# Patient Record
Sex: FEMALE | Race: WHITE
[De-identification: names, ages, dates, MRNs, and addresses within clinical notes are randomized per-mention and may not be internally consistent; named-entity substitution may affect disease eponyms.]

---

## 2017-07-05 ENCOUNTER — HOSPITAL ENCOUNTER (EMERGENCY)
Dept: HOSPITAL 47 - EC | Age: 72
Discharge: HOME | End: 2017-07-05
Payer: MEDICARE

## 2017-07-05 VITALS
DIASTOLIC BLOOD PRESSURE: 78 MMHG | TEMPERATURE: 98.1 F | SYSTOLIC BLOOD PRESSURE: 177 MMHG | HEART RATE: 56 BPM | RESPIRATION RATE: 18 BRPM

## 2017-07-05 DIAGNOSIS — Z79.899: ICD-10-CM

## 2017-07-05 DIAGNOSIS — Z53.20: ICD-10-CM

## 2017-07-05 DIAGNOSIS — I10: ICD-10-CM

## 2017-07-05 DIAGNOSIS — T18.128A: Primary | ICD-10-CM

## 2017-07-05 DIAGNOSIS — E78.5: ICD-10-CM

## 2017-07-05 DIAGNOSIS — Z91.030: ICD-10-CM

## 2017-07-05 DIAGNOSIS — Z88.6: ICD-10-CM

## 2017-07-05 PROCEDURE — 43247 EGD REMOVE FOREIGN BODY: CPT

## 2017-07-05 PROCEDURE — 96374 THER/PROPH/DIAG INJ IV PUSH: CPT

## 2017-07-05 PROCEDURE — 71020: CPT

## 2017-07-05 PROCEDURE — 96375 TX/PRO/DX INJ NEW DRUG ADDON: CPT

## 2017-07-05 PROCEDURE — 99283 EMERGENCY DEPT VISIT LOW MDM: CPT

## 2017-07-05 PROCEDURE — 96361 HYDRATE IV INFUSION ADD-ON: CPT

## 2017-07-05 NOTE — ED
General Adult HPI





- General


Source: patient, RN notes reviewed


Mode of arrival: ambulatory


Limitations: no limitations





<Joe Sims - Last Filed: 07/05/17 15:16>





<Trip Muller - Last Filed: 07/05/17 22:06>





- General


Chief complaint: ENT


Stated complaint: food in throat


Time Seen by Provider: 07/05/17 14:01





- History of Present Illness


Initial comments: 





Patient is a pleasant 71-year-old female presenting to the emergency Department 

with concern for food stuck in her throat.  Onset was 1:00 yesterday when 

patient was eating hamburger.  Patient has been unable to swallow liquids her 

pills or solids since that time.  Patient has had some similar symptoms 

previously that she is able to clear on her own however this time she is not.  

Patient has never had a scope done before.  Patient does have a history of 

ocular pharyngeal muscular dystrophy. (Joe Sims)





- Related Data


 Home Medications











 Medication  Instructions  Recorded  Confirmed


 


Levothyroxine Sodium [Synthroid] 75 mcg PO DAILY 12/23/15 07/05/17


 


Losartan Potassium 100 mg PO DAILY 12/23/15 07/05/17


 


Simvastatin [Zocor] 40 mg PO HS 12/23/15 07/05/17


 


Mirtazapine [Remeron] 15 mg PO HS 07/05/17 07/05/17











 Allergies











Allergy/AdvReac Type Severity Reaction Status Date / Time


 


venom-honey bee Allergy  Anaphylaxis Verified 07/05/17 14:21





[bee venom (honey bee)]     


 


aspirin AdvReac  severe Verified 07/05/17 14:21





   stomach  





   pain  














Review of Systems


ROS Other: All systems not noted in ROS Statement are negative.


Constitutional: Denies: fever


Eyes: Denies: eye pain


ENT: Denies: ear pain


Respiratory: Denies: dyspnea


Cardiovascular: Denies: chest pain


Endocrine: Denies: fatigue


Gastrointestinal: Reports: nausea.  Denies: abdominal pain


Genitourinary: Denies: dysuria


Musculoskeletal: Denies: back pain


Skin: Denies: rash


Neurological: Denies: headache





<Joe Sims - Last Filed: 07/05/17 15:16>


ROS Other: All systems not noted in ROS Statement are negative.





<Trip Muller - Last Filed: 07/05/17 22:06>


ROS Statement: 


Those systems with pertinent positive or pertinent negative responses have been 

documented in the HPI.








Past Medical History


Past Medical History: Hyperlipidemia, Hypertension


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


History of Any Multi-Drug Resistant Organisms: None Reported


Past Surgical History: Appendectomy, Hysterectomy, Orthopedic Surgery


Past Psychological History: No Psychological Hx Reported


Smoking Status: Never smoker


Past Alcohol Use History: None Reported


Past Drug Use History: None Reported





- Past Family History


  ** Father


Additional Family Medical History / Comment(s): lung





  ** Mother


Additional Family Medical History / Comment(s): pancreas,liver





<Joe Sims - Last Filed: 07/05/17 15:16>





General Exam


Limitations: no limitations


General appearance: alert, in no apparent distress


Head exam: Present: atraumatic


Eye exam: Present: normal appearance, PERRL


ENT exam: Present: normal oropharynx


Neck exam: Present: normal inspection


Respiratory exam: Present: normal lung sounds bilaterally


Cardiovascular Exam: Present: regular rate, normal rhythm


GI/Abdominal exam: Present: soft.  Absent: tenderness


Extremities exam: Present: normal inspection


Neurological exam: Present: alert


Psychiatric exam: Present: normal affect, normal mood


Skin exam: Present: normal color





<Joe Sims - Last Filed: 07/05/17 15:16>





Course





<Joe Sims - Last Filed: 07/05/17 15:16>





<Trip Muller - Last Filed: 07/05/17 22:06>


 Vital Signs











  07/05/17 07/05/17 07/05/17





  13:31 13:57 13:59


 


Temperature 98.0 F  


 


Pulse Rate 68 75 


 


Pulse Rate [   





Cardiac Monitor   





]   


 


Respiratory 20 18 18





Rate   


 


Blood Pressure 187/76 151/86 


 


Blood Pressure   





[Left Arm]   


 


O2 Sat by Pulse 93 L 95 





Oximetry   














  07/05/17 07/05/17 07/05/17





  14:48 15:58 17:03


 


Temperature  97.5 F L 97.5 F L


 


Pulse Rate 74 88 61


 


Pulse Rate [   





Cardiac Monitor   





]   


 


Respiratory 18 20 18





Rate   


 


Blood Pressure 169/87 174/81 204/89


 


Blood Pressure   





[Left Arm]   


 


O2 Sat by Pulse 90 L 95 96





Oximetry   














  07/05/17 07/05/17 07/05/17





  17:40 19:25 20:17


 


Temperature 97.7 F  


 


Pulse Rate 73  


 


Pulse Rate [  54 L 66





Cardiac Monitor   





]   


 


Respiratory 18 18 18





Rate   


 


Blood Pressure 199/89  


 


Blood Pressure  143/65 148/90





[Left Arm]   


 


O2 Sat by Pulse 96 97 94 L





Oximetry   














  07/05/17





  20:18


 


Temperature 97.0 F L


 


Pulse Rate 61


 


Pulse Rate [ 





Cardiac Monitor 





] 


 


Respiratory 15





Rate 


 


Blood Pressure 148/90


 


Blood Pressure 





[Left Arm] 


 


O2 Sat by Pulse 91 L





Oximetry 











Nursing staff brought to my attention that her blood pressure was high she was 

unable to swallow her pills earlier today and she still unable to swallow any 

pills considering that she was given some Vasotec IV some nitro topical, 

hopefully that will help of his blood pressure will continue to keep an eye on 

him if necessary will try another agent (Trip Muller)





- Reevaluation(s)


Reevaluation #1: 





07/05/17 15:16


No improvement with glucagon.  Case was discussed with Dr. Hopkins who is 

currently heading to another facility to see patient's but will come scope the 

patient afterwards. (Joe Sims)


Reevaluation #2: 





07/05/17 22:00








She is reassessed at 2130 she is feeling better she is able to swallow her 

saliva she is able to drink, there is no nausea is no epigastric pain or 

abdominal pain.  She will see Dr. Tamez next week and she is advised to stick 

With the Dr. Tamez's direction was advised to return to the ER if symptoms get 

worse (Trip Muller)





Disposition





<Joe Sims - Last Filed: 07/05/17 15:16>





<Trip Muller - Last Filed: 07/05/17 22:06>


Clinical Impression: 


 Foreign body in esophagus





Disposition: HOME SELF-CARE


Condition: Good


Instructions:  Esophageal Foreign Body (ED)


Additional Instructions: 


Is advised to see Dr. Tamez next week


Referrals: 


Brennen Cardenas MD [Primary Care Provider] - 1-2 days


Joseph Stringer MD [STAFF PHYSICIAN] - 1-2 days

## 2017-07-05 NOTE — P.PCN
Date of Procedure: 07/05/17


Preoperative Diagnosis: 





Postoperative Diagnosis: 





Procedure(s) Performed: 


Procedure: Esophagogastroscopy for removal of esophageal foreign body.





Preoperative diagnosis: Obstructive dysphagia.





Postoperative diagnosis: 1. Impacted piece of meat in the esophagus, fragmented 

and advanced into the stomach by gentle passing of the endoscope back and 

forth.  2. Benign esophageal stricture allowing the advancement of the 

endoscope.  3. No obvious abnormalities in the stomach.





Preparation and sedation: Was provided by anesthesia.





Brief clinical history: The patient is a pleasant 71-year-old female who 

presented to the emergency Department with concern of food stuck in her throat.

  Onset was 1:00 yesterday when patient was eating hamburger.  Patient has been 

unable to swallow liquids, her pills or solids since that time.  Patient has 

had similar symptoms previously that she was able to clear on her own but not 

this time.  Patient has never had an upper endoscopy in the past.  Patient does 

have a history of pharyngeal muscular dystrophy.





Procedure: With the patient on her left lateral decubitus position and after 

informed consent and adequate sedation, I passed the Olympus- video 

upper endoscope through the cricopharyngeus.  Upon entering the esophagus I saw 

thick secretions and food debris which I suctioned and the floating pieces of 

meat and the larger impacted piece of meat in the distal esophagus were noted.  

While maneuvering with the endoscope to arrive at the level of the GE junction 

the impacted piece of meat was broken into smaller pieces which I then was able 

to advance into the stomach with the gentle passing of the endoscope back and 

forth.  There was a benign stricture that just allowed the passing of the 

endoscope which measures between 9 and 10 mm.  No obvious esophagitis or Serna

's esophagus.  The endoscope was then passed into the stomach which was 

insufflated with air and inspected in detail including the retroflex view in 

the cardia.  No obvious abnormalities were seen.  I suctioned significant 

amount of fluids and food debris in the stomach and concluded the exam without 

performing any dilation at this time because of the presence of food debris and 

significant amount of secretions that were regurgitating into the esophagus in 

addition to areas of friability at the site of the food impaction.





The patient tolerated the procedure well.





Plan: The patient was reassured.  We will advise liquid diet today and she can 

then advance her diet to her normal diet tomorrow.  I would recommend that we 

see her in follow-up and schedule elective upper endoscopy for the purpose of 

dilation of her esophagus electively after an overnight fasting, especially if 

she has recurrent symptoms.  She will be discussing that with you and I will be 

happy to see her in F/U for such evaluation.


Implants: 





Indications for Procedure: 





Operative Findings: 





Description of Procedure:

## 2017-07-05 NOTE — XR
EXAMINATION TYPE: XR chest 2V

 

DATE OF EXAM: 7/5/2017

 

COMPARISON: NONE

 

HISTORY: Possible foreign body

 

TECHNIQUE:  Frontal and lateral views of the chest are obtained.

 

FINDINGS:  There is no heart failure nor confluent pneumonic infiltrate. Costophrenic angles are evangelina
r. Heart is slightly enlarged. Thoracic aorta is atheromatous. There are chest leads.

 

IMPRESSION:  Cardiomegaly. No active cardiopulmonary disease. No sign of radiopaque foreign body.

## 2018-09-25 ENCOUNTER — HOSPITAL ENCOUNTER (EMERGENCY)
Dept: HOSPITAL 47 - EC | Age: 73
Discharge: HOME | End: 2018-09-25
Payer: MEDICARE

## 2018-09-25 VITALS — TEMPERATURE: 97.8 F | SYSTOLIC BLOOD PRESSURE: 177 MMHG | DIASTOLIC BLOOD PRESSURE: 78 MMHG | HEART RATE: 78 BPM

## 2018-09-25 VITALS — RESPIRATION RATE: 18 BRPM

## 2018-09-25 DIAGNOSIS — Z91.030: ICD-10-CM

## 2018-09-25 DIAGNOSIS — I10: ICD-10-CM

## 2018-09-25 DIAGNOSIS — E78.5: ICD-10-CM

## 2018-09-25 DIAGNOSIS — Z98.890: ICD-10-CM

## 2018-09-25 DIAGNOSIS — Z88.6: ICD-10-CM

## 2018-09-25 DIAGNOSIS — G71.0: ICD-10-CM

## 2018-09-25 DIAGNOSIS — Z90.49: ICD-10-CM

## 2018-09-25 DIAGNOSIS — Z90.710: ICD-10-CM

## 2018-09-25 DIAGNOSIS — Z79.899: ICD-10-CM

## 2018-09-25 DIAGNOSIS — M79.89: ICD-10-CM

## 2018-09-25 DIAGNOSIS — L03.116: Primary | ICD-10-CM

## 2018-09-25 LAB
ALBUMIN SERPL-MCNC: 4 G/DL (ref 3.5–5)
ALP SERPL-CCNC: 64 U/L (ref 38–126)
ALT SERPL-CCNC: 28 U/L (ref 9–52)
ANION GAP SERPL CALC-SCNC: 9 MMOL/L
AST SERPL-CCNC: 29 U/L (ref 14–36)
BASOPHILS # BLD AUTO: 0 K/UL (ref 0–0.2)
BASOPHILS NFR BLD AUTO: 1 %
BUN SERPL-SCNC: 11 MG/DL (ref 7–17)
CALCIUM SPEC-MCNC: 9.2 MG/DL (ref 8.4–10.2)
CHLORIDE SERPL-SCNC: 109 MMOL/L (ref 98–107)
CO2 SERPL-SCNC: 25 MMOL/L (ref 22–30)
EOSINOPHIL # BLD AUTO: 0.3 K/UL (ref 0–0.7)
EOSINOPHIL NFR BLD AUTO: 6 %
ERYTHROCYTE [DISTWIDTH] IN BLOOD BY AUTOMATED COUNT: 3.92 M/UL (ref 3.8–5.4)
ERYTHROCYTE [DISTWIDTH] IN BLOOD: 13.3 % (ref 11.5–15.5)
GLUCOSE SERPL-MCNC: 99 MG/DL (ref 74–99)
HCT VFR BLD AUTO: 37.1 % (ref 34–46)
HGB BLD-MCNC: 12.1 GM/DL (ref 11.4–16)
LYMPHOCYTES # SPEC AUTO: 1.9 K/UL (ref 1–4.8)
LYMPHOCYTES NFR SPEC AUTO: 41 %
MCH RBC QN AUTO: 30.8 PG (ref 25–35)
MCHC RBC AUTO-ENTMCNC: 32.6 G/DL (ref 31–37)
MCV RBC AUTO: 94.6 FL (ref 80–100)
MONOCYTES # BLD AUTO: 0.3 K/UL (ref 0–1)
MONOCYTES NFR BLD AUTO: 6 %
NEUTROPHILS # BLD AUTO: 2 K/UL (ref 1.3–7.7)
NEUTROPHILS NFR BLD AUTO: 44 %
PH UR: 5.5 [PH] (ref 5–8)
PLATELET # BLD AUTO: 172 K/UL (ref 150–450)
POTASSIUM SERPL-SCNC: 3.9 MMOL/L (ref 3.5–5.1)
PROT SERPL-MCNC: 7.1 G/DL (ref 6.3–8.2)
RBC UR QL: 2 /HPF (ref 0–5)
SODIUM SERPL-SCNC: 143 MMOL/L (ref 137–145)
SP GR UR: 1.02 (ref 1–1.03)
SQUAMOUS UR QL AUTO: 12 /HPF (ref 0–4)
UROBILINOGEN UR QL STRIP: <2 MG/DL (ref ?–2)
WBC # BLD AUTO: 4.5 K/UL (ref 3.8–10.6)
WBC #/AREA URNS HPF: 52 /HPF (ref 0–5)

## 2018-09-25 PROCEDURE — 83880 ASSAY OF NATRIURETIC PEPTIDE: CPT

## 2018-09-25 PROCEDURE — 93971 EXTREMITY STUDY: CPT

## 2018-09-25 PROCEDURE — 36415 COLL VENOUS BLD VENIPUNCTURE: CPT

## 2018-09-25 PROCEDURE — 87086 URINE CULTURE/COLONY COUNT: CPT

## 2018-09-25 PROCEDURE — 86140 C-REACTIVE PROTEIN: CPT

## 2018-09-25 PROCEDURE — 80053 COMPREHEN METABOLIC PANEL: CPT

## 2018-09-25 PROCEDURE — 81001 URINALYSIS AUTO W/SCOPE: CPT

## 2018-09-25 PROCEDURE — 96374 THER/PROPH/DIAG INJ IV PUSH: CPT

## 2018-09-25 PROCEDURE — 85025 COMPLETE CBC W/AUTO DIFF WBC: CPT

## 2018-09-25 PROCEDURE — 99284 EMERGENCY DEPT VISIT MOD MDM: CPT

## 2018-09-25 NOTE — ED
General Adult HPI





- General


Chief complaint: Extremity Problem,Nontraumatic


Stated complaint: legs swelling and itching


Time Seen by Provider: 09/25/18 15:36


Source: patient, RN notes reviewed


Mode of arrival: ambulatory


Limitations: no limitations





- History of Present Illness


Initial comments: 





72-year-old female presents to the emergency department for a chief complaint 

of swelling in the left leg as well as itching and redness in bilateral legs.  

Patient states bilateral lower extremity itching and erythema started 3 days 

ago.  Patient states that late last night she started noticed swelling in the 

left foot and ankle.  Patient denies swelling in the right foot or ankle.  

Patient denies history of CHF.  Patient denies any shortness of breath or chest 

pain.  Patient denies any fevers or chills at home.  Patient states she tried 

to get into her primary care provider but was unable to do so as [place] 

[place].  Patient denies pain in either calf. Patient has no other 

complaints at this time including shortness of breath, chest pain, abdominal 

pain, nausea or vomiting, headache, or visual changes.





- Related Data


 Home Medications











 Medication  Instructions  Recorded  Confirmed


 


Levothyroxine Sodium [Synthroid] 75 mcg PO DAILY 12/23/15 09/25/18


 


Losartan Potassium 100 mg PO DAILY 12/23/15 09/25/18


 


Simvastatin [Zocor] 20 mg PO HS 09/25/18 09/25/18








 Previous Rx's











 Medication  Instructions  Recorded


 


Cephalexin [Keflex] 500 mg PO Q6H 10 Days #20 cap 09/25/18


 


hydrOXYzine HCL [Atarax] 25 mg PO TID PRN #20 tab 09/25/18











 Allergies











Allergy/AdvReac Type Severity Reaction Status Date / Time


 


venom-honey bee Allergy  Anaphylaxis Verified 09/25/18 16:31





[bee venom (honey bee)]     


 


aspirin AdvReac  severe Verified 09/25/18 16:31





   stomach  





   pain  














Review of Systems


ROS Statement: 


Those systems with pertinent positive or pertinent negative responses have been 

documented in the HPI.





ROS Other: All systems not noted in ROS Statement are negative.





Past Medical History


Past Medical History: Hyperlipidemia, Hypertension


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


History of Any Multi-Drug Resistant Organisms: None Reported


Past Surgical History: Appendectomy, Hysterectomy, Orthopedic Surgery


Past Psychological History: No Psychological Hx Reported


Smoking Status: Never smoker


Past Alcohol Use History: None Reported


Past Drug Use History: None Reported





- Past Family History


  ** Father


Additional Family Medical History / Comment(s): lung





  ** Mother


Additional Family Medical History / Comment(s): pancreas,liver





General Exam


Limitations: no limitations


General appearance: alert, in no apparent distress


Head exam: Present: atraumatic, normocephalic, normal inspection


Eye exam: Present: normal appearance.  Absent: scleral icterus, conjunctival 

injection


ENT exam: Present: normal exam, mucous membranes moist


Neck exam: Present: normal inspection, full ROM.  Absent: tenderness, 

meningismus, lymphadenopathy


Respiratory exam: Present: normal lung sounds bilaterally.  Absent: respiratory 

distress, wheezes, rales, rhonchi, stridor


Cardiovascular Exam: Present: regular rate, normal rhythm, normal heart sounds.

  Absent: systolic murmur, diastolic murmur, rubs, gallop, clicks


GI/Abdominal exam: Present: soft, normal bowel sounds.  Absent: distended, 

tenderness, guarding, rebound, rigid


Extremities exam: Present: full ROM (Full range of motion in the lower 

extremities bilaterally), normal capillary refill (Capillary refill less than 2 

seconds and PT pulse 2+ in lower extremities bilaterally), pedal edema (2+ 

pitting edema noted in the left foot and ankle.  Patient has pitting edema 

noted in the right ankle but not the right foot.).  Absent: tenderness (No 

tenderness noted in the lower extremities), calf tenderness (No tenderness in 

the calves bilaterally.  Negative Homans sign bilaterally.  No erythema, 

swelling, or warmth noted bilaterally)


Neurological exam: Present: alert, oriented X3, CN II-XII intact


Psychiatric exam: Present: normal affect, normal mood





Course


 Vital Signs











  09/25/18 09/25/18





  15:21 17:46


 


Temperature 98.2 F 98 F


 


Pulse Rate 67 62


 


Respiratory 18 18





Rate  


 


Blood Pressure 157/70 185/80


 


O2 Sat by Pulse 97 97





Oximetry  














Medical Decision Making





- Medical Decision Making





72-year-old female presents to the emergency department for redness and itching 

in lower ankles 3 days and left ankle swelling times one day.  Patient denies 

fevers or chills at home.  On exam patient does have mild erythema noted on the 

medial ankles as well as mild pitting edema of the left ankle and foot.  

Erythema consistent with a cellulitis.  CBC and CMP unremarkable.  White count 

4.5.  CRP 7.4.  .  Doppler negative for clot.  Patient will be treated 

with Keflex 10 days.  She was given a dose here.  She will be given Atarax for 

itching.  She was educated not to drive or operate machinery while taking 

Atarax.  Patient is aware to return to the emergency Department if she has any 

worsening symptoms which she agrees with.





- Lab Data


Result diagrams: 


 09/25/18 17:26





 09/25/18 17:26


 Lab Results











  09/25/18 09/25/18 09/25/18 Range/Units





  17:26 17:26 17:26 


 


WBC  4.5    (3.8-10.6)  k/uL


 


RBC  3.92    (3.80-5.40)  m/uL


 


Hgb  12.1    (11.4-16.0)  gm/dL


 


Hct  37.1    (34.0-46.0)  %


 


MCV  94.6    (80.0-100.0)  fL


 


MCH  30.8    (25.0-35.0)  pg


 


MCHC  32.6    (31.0-37.0)  g/dL


 


RDW  13.3    (11.5-15.5)  %


 


Plt Count  172    (150-450)  k/uL


 


Neutrophils %  44    %


 


Lymphocytes %  41    %


 


Monocytes %  6    %


 


Eosinophils %  6    %


 


Basophils %  1    %


 


Neutrophils #  2.0    (1.3-7.7)  k/uL


 


Lymphocytes #  1.9    (1.0-4.8)  k/uL


 


Monocytes #  0.3    (0-1.0)  k/uL


 


Eosinophils #  0.3    (0-0.7)  k/uL


 


Basophils #  0.0    (0-0.2)  k/uL


 


Sodium   143   (137-145)  mmol/L


 


Potassium   3.9   (3.5-5.1)  mmol/L


 


Chloride   109 H   ()  mmol/L


 


Carbon Dioxide   25   (22-30)  mmol/L


 


Anion Gap   9   mmol/L


 


BUN   11   (7-17)  mg/dL


 


Creatinine   0.79   (0.52-1.04)  mg/dL


 


Est GFR (CKD-EPI)AfAm   87   (>60 ml/min/1.73 sqM)  


 


Est GFR (CKD-EPI)NonAf   75   (>60 ml/min/1.73 sqM)  


 


Glucose   99   (74-99)  mg/dL


 


Calcium   9.2   (8.4-10.2)  mg/dL


 


Total Bilirubin   1.1   (0.2-1.3)  mg/dL


 


AST   29   (14-36)  U/L


 


ALT   28   (9-52)  U/L


 


Alkaline Phosphatase   64   ()  U/L


 


C-Reactive Protein   7.4   (<10.0)  mg/L


 


NT-Pro-B Natriuret Pep    434  pg/mL


 


Total Protein   7.1   (6.3-8.2)  g/dL


 


Albumin   4.0   (3.5-5.0)  g/dL














Disposition


Clinical Impression: 


 Cellulitis





Disposition: HOME SELF-CARE


Condition: Good


Instructions:  Cellulitis (ED)


Additional Instructions: 


Please take antibiotic as directed.  Please take Atarax for itching as needed.  

Do not drive or operate machinery when taking Atarax.  Please follow-up with 

primary care in 1-2 days.  Return to the emergency department if you have any 

worsening symptoms.


Prescriptions: 


Cephalexin [Keflex] 500 mg PO Q6H 10 Days #20 cap


hydrOXYzine HCL [Atarax] 25 mg PO TID PRN #20 tab


 PRN Reason: Itching


Is patient prescribed a controlled substance at d/c from ED?: No


Referrals: 


Brennen Cardenas MD [Primary Care Provider] - 1-2 days


Time of Disposition: 18:37

## 2019-01-16 ENCOUNTER — HOSPITAL ENCOUNTER (OUTPATIENT)
Dept: HOSPITAL 47 - LABWHC1 | Age: 74
Discharge: HOME | End: 2019-01-16
Attending: OTOLARYNGOLOGY
Payer: MEDICARE

## 2019-01-16 ENCOUNTER — HOSPITAL ENCOUNTER (EMERGENCY)
Dept: HOSPITAL 47 - EC | Age: 74
Discharge: HOME | End: 2019-01-16
Payer: MEDICARE

## 2019-01-16 VITALS — SYSTOLIC BLOOD PRESSURE: 156 MMHG | DIASTOLIC BLOOD PRESSURE: 58 MMHG | HEART RATE: 51 BPM

## 2019-01-16 VITALS — RESPIRATION RATE: 18 BRPM | TEMPERATURE: 98.1 F

## 2019-01-16 DIAGNOSIS — Z88.6: ICD-10-CM

## 2019-01-16 DIAGNOSIS — E78.5: ICD-10-CM

## 2019-01-16 DIAGNOSIS — Z91.030: ICD-10-CM

## 2019-01-16 DIAGNOSIS — L50.0: Primary | ICD-10-CM

## 2019-01-16 DIAGNOSIS — Z79.899: ICD-10-CM

## 2019-01-16 DIAGNOSIS — I10: ICD-10-CM

## 2019-01-16 DIAGNOSIS — R07.89: Primary | ICD-10-CM

## 2019-01-16 DIAGNOSIS — I45.10: ICD-10-CM

## 2019-01-16 DIAGNOSIS — R00.1: ICD-10-CM

## 2019-01-16 LAB
ALBUMIN SERPL-MCNC: 4.1 G/DL (ref 3.5–5)
ALP SERPL-CCNC: 61 U/L (ref 38–126)
ALT SERPL-CCNC: 33 U/L (ref 9–52)
ANION GAP SERPL CALC-SCNC: 6 MMOL/L
APTT BLD: 22.9 SEC (ref 22–30)
AST SERPL-CCNC: 30 U/L (ref 14–36)
BASOPHILS # BLD AUTO: 0 K/UL (ref 0–0.2)
BASOPHILS NFR BLD AUTO: 0 %
BUN SERPL-SCNC: 16 MG/DL (ref 7–17)
CALCIUM SPEC-MCNC: 9.4 MG/DL (ref 8.4–10.2)
CHLORIDE SERPL-SCNC: 109 MMOL/L (ref 98–107)
CK SERPL-CCNC: 269 U/L (ref 30–135)
CO2 SERPL-SCNC: 25 MMOL/L (ref 22–30)
EOSINOPHIL # BLD AUTO: 0.2 K/UL (ref 0–0.7)
EOSINOPHIL NFR BLD AUTO: 4 %
ERYTHROCYTE [DISTWIDTH] IN BLOOD BY AUTOMATED COUNT: 3.98 M/UL (ref 3.8–5.4)
ERYTHROCYTE [DISTWIDTH] IN BLOOD: 13.3 % (ref 11.5–15.5)
GLUCOSE SERPL-MCNC: 98 MG/DL (ref 74–99)
HCT VFR BLD AUTO: 37.2 % (ref 34–46)
HGB BLD-MCNC: 12.5 GM/DL (ref 11.4–16)
INR PPP: 1 (ref ?–1.2)
LIPASE SERPL-CCNC: 129 U/L (ref 23–300)
LYMPHOCYTES # SPEC AUTO: 1.6 K/UL (ref 1–4.8)
LYMPHOCYTES NFR SPEC AUTO: 36 %
MAGNESIUM SPEC-SCNC: 2 MG/DL (ref 1.6–2.3)
MCH RBC QN AUTO: 31.3 PG (ref 25–35)
MCHC RBC AUTO-ENTMCNC: 33.5 G/DL (ref 31–37)
MCV RBC AUTO: 93.5 FL (ref 80–100)
MONOCYTES # BLD AUTO: 0.3 K/UL (ref 0–1)
MONOCYTES NFR BLD AUTO: 7 %
NEUTROPHILS # BLD AUTO: 2.3 K/UL (ref 1.3–7.7)
NEUTROPHILS NFR BLD AUTO: 50 %
PLATELET # BLD AUTO: 171 K/UL (ref 150–450)
POTASSIUM SERPL-SCNC: 4.4 MMOL/L (ref 3.5–5.1)
PROT SERPL-MCNC: 7.3 G/DL (ref 6.3–8.2)
PT BLD: 11 SEC (ref 9–12)
SODIUM SERPL-SCNC: 140 MMOL/L (ref 137–145)
TROPONIN I SERPL-MCNC: <0.012 NG/ML (ref 0–0.03)
WBC # BLD AUTO: 4.5 K/UL (ref 3.8–10.6)

## 2019-01-16 PROCEDURE — 85610 PROTHROMBIN TIME: CPT

## 2019-01-16 PROCEDURE — 99285 EMERGENCY DEPT VISIT HI MDM: CPT

## 2019-01-16 PROCEDURE — 71046 X-RAY EXAM CHEST 2 VIEWS: CPT

## 2019-01-16 PROCEDURE — 36415 COLL VENOUS BLD VENIPUNCTURE: CPT

## 2019-01-16 PROCEDURE — 83735 ASSAY OF MAGNESIUM: CPT

## 2019-01-16 PROCEDURE — 85025 COMPLETE CBC W/AUTO DIFF WBC: CPT

## 2019-01-16 PROCEDURE — 85730 THROMBOPLASTIN TIME PARTIAL: CPT

## 2019-01-16 PROCEDURE — 80053 COMPREHEN METABOLIC PANEL: CPT

## 2019-01-16 PROCEDURE — 82785 ASSAY OF IGE: CPT

## 2019-01-16 PROCEDURE — 86003 ALLG SPEC IGE CRUDE XTRC EA: CPT

## 2019-01-16 PROCEDURE — 93005 ELECTROCARDIOGRAM TRACING: CPT

## 2019-01-16 PROCEDURE — 82553 CREATINE MB FRACTION: CPT

## 2019-01-16 PROCEDURE — 82550 ASSAY OF CK (CPK): CPT

## 2019-01-16 PROCEDURE — 83690 ASSAY OF LIPASE: CPT

## 2019-01-16 PROCEDURE — 84484 ASSAY OF TROPONIN QUANT: CPT

## 2019-01-16 NOTE — ED
General Adult HPI





- General


Chief complaint: Chest Pain


Stated complaint: Chest pain


Source: patient


Mode of arrival: wheelchair


Limitations: no limitations





- Related Data


 Home Medications











 Medication  Instructions  Recorded  Confirmed


 


Levothyroxine Sodium [Synthroid] 75 mcg PO DAILY 12/23/15 01/16/19


 


Losartan Potassium 100 mg PO DAILY 12/23/15 01/16/19


 


Simvastatin [Zocor] 20 mg PO HS 09/25/18 01/16/19











 Allergies











Allergy/AdvReac Type Severity Reaction Status Date / Time


 


venom-honey bee Allergy  Anaphylaxis Verified 01/16/19 15:51





[bee venom (honey bee)]     


 


aspirin AdvReac  severe Verified 01/16/19 15:51





   stomach  





   pain  














Review of Systems


ROS Statement: 


Those systems with pertinent positive or pertinent negative responses have been 

documented in the HPI.





ROS Other: All systems not noted in ROS Statement are negative.





Past Medical History


Past Medical History: Hyperlipidemia, Hypertension


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


History of Any Multi-Drug Resistant Organisms: None Reported


Past Surgical History: Appendectomy, Hysterectomy, Orthopedic Surgery


Past Psychological History: No Psychological Hx Reported


Smoking Status: Never smoker


Past Alcohol Use History: None Reported


Past Drug Use History: None Reported





- Past Family History


  ** Father


Additional Family Medical History / Comment(s): lung





  ** Mother


Additional Family Medical History / Comment(s): pancreas,liver





General Exam


Limitations: no limitations





Course


 Vital Signs











  01/16/19 01/16/19 01/16/19





  15:29 16:42 17:41


 


Temperature 98.1 F  


 


Pulse Rate 56 L 74 50 L


 


Respiratory 18 18 18





Rate   


 


Blood Pressure 171/89 179/75 163/88


 


O2 Sat by Pulse 98 99 97





Oximetry   














Medical Decision Making





- Medical Decision Making





Dictation was produced using dragon dictation software. please excuse any 

grammatical, word or spelling errors. 





Chief Complaint: 73-year-old female with past medical history of muscular 

dystrophy, dyslipidemia and hypertension presents with chest pain.





History of Present Illness: It is a 73-year-old female she presents with chief 

complaint of chest pain.  She states her symptoms started today.  She reports 

that they're intermittent and located to the left anterior chest.  She states 

they are episodic lasting from several minutes.  She had about 4 episodes today 

since waking this morning.  Patient denies any exacerbating or mitigating 

factors.  Patient denies any cardiac history.  Patient does have history of 

dyslipidemia and hypertension.  Denies any numbness tingling paresthesias to 

the arms or legs.  Denies any cough








The ROS documented in this emergency department record has been reviewed and 

confirmed by me.  Those systems with pertinent positive or negative responses 

have been documented in the HPI.  All other systems are other negative and/or 

noncontributory.








PHYSICAL EXAM:


General Impression: Alert and oriented x3, not in acute distress


HEENT: Normocephalic atraumatic, extra-ocular movements intact, pupils equal 

and reactive to light bilaterally, mucous membranes moist.


Cardiovascular: Heart regular rate and rhythm, S1&S2 audible, no murmurs, rubs 

or gallops


Chest: Lungs clear to auscultation bilaterally, no rhonchi, no wheeze, no rales


Abdomen: Bowel sounds present, abdomen soft, non-tender, non-distended, no 

organomegaly


Musculoskeletal: Pulses present and equal in all extremities, no peripheral 

edema


Motor: Power 5/5 bilaterally, no focal deficits noted


Neurological: CN II-XII grossly intact, no focal motor or sensory deficits noted


Skin: Intact with no visualized rashes


Psych: Normal affect and mood





ED course: 73-year-old female presents with chief complaint of chest pain.  

Patient's clinical presentation consistent with atypical chest pain with 

typical features.  Vital signs upon arrival are within acceptable limits.





Laboratory evaluation obtained.  CBC, coag panel, metabolic panel is 

unremarkable.  Cardiac enzymes are negative.  Chest X is unremarkable.  Patient 

observed [facility] department for several hours on the monitor without any dynamic 

changes.  Patient is reevaluated and is asymptomatic.  Patient's symptoms are 

atypical.  She is told that she should follow-up with cardiology for outpatient 

cardiac stress test.  Vision understandable and agreeable to disposition.  Told 

to return to the [facility] department if she has any any worsening symptoms or 

recurrent chest pain.  Patient given Plavix.  She is ALLERGIC to aspirin.





EKG interpretation: Ventricular rate 50, sinus bradycardia,.  Interval to 4, 

QRS duration 158, QTc 433.  Right bundle branch block. No CT prolongation, no 

QTC prolongation, no ST or T-wave changes noted.  EKG compared to 12/22/2015 

showing no changes.  Overall, this EKG is unremarkable








- Lab Data


Result diagrams: 


 01/16/19 15:55





 01/16/19 15:55


 Lab Results











  01/16/19 01/16/19 01/16/19 Range/Units





  15:55 15:55 15:55 


 


WBC   4.5   (3.8-10.6)  k/uL


 


RBC   3.98   (3.80-5.40)  m/uL


 


Hgb   12.5   (11.4-16.0)  gm/dL


 


Hct   37.2   (34.0-46.0)  %


 


MCV   93.5   (80.0-100.0)  fL


 


MCH   31.3   (25.0-35.0)  pg


 


MCHC   33.5   (31.0-37.0)  g/dL


 


RDW   13.3   (11.5-15.5)  %


 


Plt Count   171   (150-450)  k/uL


 


Neutrophils %   50   %


 


Lymphocytes %   36   %


 


Monocytes %   7   %


 


Eosinophils %   4   %


 


Basophils %   0   %


 


Neutrophils #   2.3   (1.3-7.7)  k/uL


 


Lymphocytes #   1.6   (1.0-4.8)  k/uL


 


Monocytes #   0.3   (0-1.0)  k/uL


 


Eosinophils #   0.2   (0-0.7)  k/uL


 


Basophils #   0.0   (0-0.2)  k/uL


 


PT     (9.0-12.0)  sec


 


INR     (<1.2)  


 


APTT     (22.0-30.0)  sec


 


Sodium    140  (137-145)  mmol/L


 


Potassium    4.4  (3.5-5.1)  mmol/L


 


Chloride    109 H  ()  mmol/L


 


Carbon Dioxide    25  (22-30)  mmol/L


 


Anion Gap    6  mmol/L


 


BUN    16  (7-17)  mg/dL


 


Creatinine    0.84  (0.52-1.04)  mg/dL


 


Est GFR (CKD-EPI)AfAm    79  (>60 ml/min/1.73 sqM)  


 


Est GFR (CKD-EPI)NonAf    69  (>60 ml/min/1.73 sqM)  


 


Glucose    98  (74-99)  mg/dL


 


Calcium    9.4  (8.4-10.2)  mg/dL


 


Magnesium    2.0  (1.6-2.3)  mg/dL


 


Total Bilirubin    1.9 H  (0.2-1.3)  mg/dL


 


AST    30  (14-36)  U/L


 


ALT    33  (9-52)  U/L


 


Alkaline Phosphatase    61  ()  U/L


 


Total Creatine Kinase  269 H    ()  U/L


 


CK-MB (CK-2)  2.4    (0.0-2.4)  ng/mL


 


CK-MB (CK-2) Rel Index  0.9    


 


Troponin I  <0.012    (0.000-0.034)  ng/mL


 


Total Protein    7.3  (6.3-8.2)  g/dL


 


Albumin    4.1  (3.5-5.0)  g/dL


 


Lipase    129  ()  U/L














  01/16/19 Range/Units





  15:55 


 


WBC   (3.8-10.6)  k/uL


 


RBC   (3.80-5.40)  m/uL


 


Hgb   (11.4-16.0)  gm/dL


 


Hct   (34.0-46.0)  %


 


MCV   (80.0-100.0)  fL


 


MCH   (25.0-35.0)  pg


 


MCHC   (31.0-37.0)  g/dL


 


RDW   (11.5-15.5)  %


 


Plt Count   (150-450)  k/uL


 


Neutrophils %   %


 


Lymphocytes %   %


 


Monocytes %   %


 


Eosinophils %   %


 


Basophils %   %


 


Neutrophils #   (1.3-7.7)  k/uL


 


Lymphocytes #   (1.0-4.8)  k/uL


 


Monocytes #   (0-1.0)  k/uL


 


Eosinophils #   (0-0.7)  k/uL


 


Basophils #   (0-0.2)  k/uL


 


PT  11.0  (9.0-12.0)  sec


 


INR  1.0  (<1.2)  


 


APTT  22.9  (22.0-30.0)  sec


 


Sodium   (137-145)  mmol/L


 


Potassium   (3.5-5.1)  mmol/L


 


Chloride   ()  mmol/L


 


Carbon Dioxide   (22-30)  mmol/L


 


Anion Gap   mmol/L


 


BUN   (7-17)  mg/dL


 


Creatinine   (0.52-1.04)  mg/dL


 


Est GFR (CKD-EPI)AfAm   (>60 ml/min/1.73 sqM)  


 


Est GFR (CKD-EPI)NonAf   (>60 ml/min/1.73 sqM)  


 


Glucose   (74-99)  mg/dL


 


Calcium   (8.4-10.2)  mg/dL


 


Magnesium   (1.6-2.3)  mg/dL


 


Total Bilirubin   (0.2-1.3)  mg/dL


 


AST   (14-36)  U/L


 


ALT   (9-52)  U/L


 


Alkaline Phosphatase   ()  U/L


 


Total Creatine Kinase   ()  U/L


 


CK-MB (CK-2)   (0.0-2.4)  ng/mL


 


CK-MB (CK-2) Rel Index   


 


Troponin I   (0.000-0.034)  ng/mL


 


Total Protein   (6.3-8.2)  g/dL


 


Albumin   (3.5-5.0)  g/dL


 


Lipase   ()  U/L














Disposition


Clinical Impression: 


 Chest pain





Disposition: HOME SELF-CARE


Condition: Good


Instructions:  Chest Pain (ED)


Is patient prescribed a controlled substance at d/c from ED?: No


Referrals: 


Brennen Cardenas MD [Primary Care Provider] - 1-2 days


Time of Disposition: 17:45

## 2019-01-16 NOTE — XR
EXAMINATION TYPE: XR chest 2V

 

DATE OF EXAM: 1/16/2019

 

COMPARISON: Chest x-ray July 5, 2017

 

HISTORY: Left-sided chest pain.

 

TECHNIQUE:  Frontal and lateral views of the chest are obtained.

 

FINDINGS:  There is no focal air space opacity, pleural effusion, or pneumothorax seen.  The cardiac 
silhouette size remains enlarged with atherosclerotic aorta.   The osseous structures are intact.

 

IMPRESSION:  Cardiomegaly without acute pulmonary process. No significant change from prior.

## 2019-01-17 LAB
BAKER'S YEAST IGE QN: <0.35 KU/L (ref ?–0.35)
BEEF IGE RAST: (no result)
CHICKEN SERUM PROT IGE RAST: (no result)
LTX IGE RAST: (no result)
PORK IGE RAST: (no result)
WALNUT IGE QN: <0.1 KU/L

## 2019-07-22 ENCOUNTER — HOSPITAL ENCOUNTER (OUTPATIENT)
Dept: HOSPITAL 47 - RADCTMAIN | Age: 74
Discharge: HOME | End: 2019-07-22
Attending: INTERNAL MEDICINE
Payer: MEDICARE

## 2019-07-22 DIAGNOSIS — G31.9: Primary | ICD-10-CM

## 2019-07-22 PROCEDURE — 70450 CT HEAD/BRAIN W/O DYE: CPT

## 2019-07-22 NOTE — CT
EXAMINATION TYPE: CT brain wo con

 

DATE OF EXAM: 7/22/2019

 

COMPARISON: 6/4/2010

 

HISTORY: 73-year-old female Right sided "buzzing" in ear. Benign neoplasm of meninges unspecified.

 

TECHNIQUE:  Examination was done in axial plane without intravenous contrast.  Coronal and sagittal r
econstructions performed.

 

CT DLP: 1054.2 mGycm

Automated exposure control for dose reduction was used.

 

FINDINGS:

There is no evidence of  acute intracranial hemorrhage, acute ischemic changes, mass effect, or extra
-axial fluid collection.  There is no effacement of cerebral sulci or basal subarachnoid cisterns.  T
here is no hydrocephalus.  There is no midline shift.  Gray-white matter distinction is preserved.

 

There is moderate cerebral cortical atrophy, stable to slightly progressed from 2010.

 

Stable partially calcified round extra-axial lesion along the anterior right frontal region measuring
 1.2 cm versus 1.1 cm, previously, not significantly changed.

 

Paranasal sinuses and mastoid air cells well pneumatized. Orbits and globes are intact. Leftward nasa
l septal deviation.

 

 

 

 

IMPRESSION:

 

1. Moderate cortical atrophy, stable to slightly progressed from 2010. No acute intracranial abnormal
ity seen.

2. Benign 1.2 cm anterior right frontal meningioma versus 1.1 cm in 2010.

## 2020-11-24 ENCOUNTER — HOSPITAL ENCOUNTER (EMERGENCY)
Dept: HOSPITAL 47 - EC | Age: 75
LOS: 1 days | Discharge: HOME | End: 2020-11-25
Payer: MEDICARE

## 2020-11-24 DIAGNOSIS — E78.5: ICD-10-CM

## 2020-11-24 DIAGNOSIS — Z79.890: ICD-10-CM

## 2020-11-24 DIAGNOSIS — U07.1: Primary | ICD-10-CM

## 2020-11-24 DIAGNOSIS — Z88.6: ICD-10-CM

## 2020-11-24 DIAGNOSIS — Z91.030: ICD-10-CM

## 2020-11-24 DIAGNOSIS — I10: ICD-10-CM

## 2020-11-24 DIAGNOSIS — Z79.899: ICD-10-CM

## 2020-11-24 LAB
ALBUMIN SERPL-MCNC: 4.3 G/DL (ref 3.5–5)
ALP SERPL-CCNC: 64 U/L (ref 38–126)
ALT SERPL-CCNC: 20 U/L (ref 4–34)
ANION GAP SERPL CALC-SCNC: 11 MMOL/L
AST SERPL-CCNC: 35 U/L (ref 14–36)
BASOPHILS # BLD AUTO: 0.1 K/UL (ref 0–0.2)
BASOPHILS NFR BLD AUTO: 1 %
BUN SERPL-SCNC: 17 MG/DL (ref 7–17)
CALCIUM SPEC-MCNC: 9.2 MG/DL (ref 8.4–10.2)
CHLORIDE SERPL-SCNC: 104 MMOL/L (ref 98–107)
CO2 SERPL-SCNC: 22 MMOL/L (ref 22–30)
EOSINOPHIL # BLD AUTO: 0 K/UL (ref 0–0.7)
EOSINOPHIL NFR BLD AUTO: 0 %
ERYTHROCYTE [DISTWIDTH] IN BLOOD BY AUTOMATED COUNT: 4.13 M/UL (ref 3.8–5.4)
ERYTHROCYTE [DISTWIDTH] IN BLOOD: 13 % (ref 11.5–15.5)
GLUCOSE SERPL-MCNC: 85 MG/DL (ref 74–99)
HCT VFR BLD AUTO: 38.2 % (ref 34–46)
HGB BLD-MCNC: 12.8 GM/DL (ref 11.4–16)
LDH SPEC-CCNC: 746 U/L (ref 313–618)
LYMPHOCYTES # SPEC AUTO: 0.6 K/UL (ref 1–4.8)
LYMPHOCYTES NFR SPEC AUTO: 13 %
MAGNESIUM SPEC-SCNC: 1.9 MG/DL (ref 1.6–2.3)
MCH RBC QN AUTO: 30.9 PG (ref 25–35)
MCHC RBC AUTO-ENTMCNC: 33.5 G/DL (ref 31–37)
MCV RBC AUTO: 92.5 FL (ref 80–100)
MONOCYTES # BLD AUTO: 0.3 K/UL (ref 0–1)
MONOCYTES NFR BLD AUTO: 7 %
NEUTROPHILS # BLD AUTO: 3.5 K/UL (ref 1.3–7.7)
NEUTROPHILS NFR BLD AUTO: 78 %
PLATELET # BLD AUTO: 125 K/UL (ref 150–450)
POTASSIUM SERPL-SCNC: 4.5 MMOL/L (ref 3.5–5.1)
PROT SERPL-MCNC: 7.7 G/DL (ref 6.3–8.2)
SODIUM SERPL-SCNC: 137 MMOL/L (ref 137–145)
WBC # BLD AUTO: 4.6 K/UL (ref 3.8–10.6)

## 2020-11-24 PROCEDURE — 36415 COLL VENOUS BLD VENIPUNCTURE: CPT

## 2020-11-24 PROCEDURE — 83605 ASSAY OF LACTIC ACID: CPT

## 2020-11-24 PROCEDURE — 85730 THROMBOPLASTIN TIME PARTIAL: CPT

## 2020-11-24 PROCEDURE — 85025 COMPLETE CBC W/AUTO DIFF WBC: CPT

## 2020-11-24 PROCEDURE — 86140 C-REACTIVE PROTEIN: CPT

## 2020-11-24 PROCEDURE — 87635 SARS-COV-2 COVID-19 AMP PRB: CPT

## 2020-11-24 PROCEDURE — 93005 ELECTROCARDIOGRAM TRACING: CPT

## 2020-11-24 PROCEDURE — 87502 INFLUENZA DNA AMP PROBE: CPT

## 2020-11-24 PROCEDURE — 82728 ASSAY OF FERRITIN: CPT

## 2020-11-24 PROCEDURE — 83735 ASSAY OF MAGNESIUM: CPT

## 2020-11-24 PROCEDURE — 85610 PROTHROMBIN TIME: CPT

## 2020-11-24 PROCEDURE — 71045 X-RAY EXAM CHEST 1 VIEW: CPT

## 2020-11-24 PROCEDURE — 84145 PROCALCITONIN (PCT): CPT

## 2020-11-24 PROCEDURE — 87040 BLOOD CULTURE FOR BACTERIA: CPT

## 2020-11-24 PROCEDURE — 80053 COMPREHEN METABOLIC PANEL: CPT

## 2020-11-24 PROCEDURE — 99285 EMERGENCY DEPT VISIT HI MDM: CPT

## 2020-11-24 PROCEDURE — 83615 LACTATE (LD) (LDH) ENZYME: CPT

## 2020-11-24 NOTE — XR
EXAMINATION TYPE: XR chest 1V portable

 

DATE OF EXAM: 11/24/2020

 

COMPARISON: 1/16/2019

 

HISTORY: Short of breath. Fever.

 

TECHNIQUE:

 

FINDINGS: Heart appears slightly enlarged. There is no heart failure. Lungs appear clear of consolida
tion. There are no hilar masses. Bony thorax is intact.

 

IMPRESSION: Cardiomegaly. No definite acute lung disease. No heart failure seen.

## 2020-11-25 VITALS
TEMPERATURE: 97.6 F | DIASTOLIC BLOOD PRESSURE: 73 MMHG | HEART RATE: 60 BPM | SYSTOLIC BLOOD PRESSURE: 151 MMHG | RESPIRATION RATE: 18 BRPM

## 2020-11-25 LAB — FERRITIN SERPL-MCNC: 299.9 NG/ML (ref 10–291)

## 2020-11-25 NOTE — ED
General Adult HPI





- General


Chief complaint: Shortness of Breath


Stated complaint: SOB,Fever,Weakness


Time Seen by Provider: 11/24/20 21:15


Source: patient, RN notes reviewed, old records reviewed


Mode of arrival: wheelchair


Limitations: no limitations





- History of Present Illness


Initial comments: 


74-year-old female patient to ED for evaluation cough congestion.  Patient 

reports that she is not currently short of breath however when she walks around 

with a mask and she does get somewhat short of breath.  Denies any chest pain.  

Patient had fevers today.  Denies any other complaints.





Systemic: Pt denies fatigue, rash. Pt denies weakness, night sweats, weight 

loss. 


Neuro: Pt denies headache, visual disturbances, syncope or pre-syncope.


HEENT: Pt denies ocular discharge or irritation, otalgia, rhinorrhea, 

pharyngitis or notable lymphadenopathy. 


Cardiopulmonary: Pt denies chest pain, SOB, heart palpitations, dyspnea on 

exertion.  


Abdominal/GI: Pt denies abdominal pain, n/v/d. 


: Pt denies dysuria, burning w/ urination, frequency/urgency. Denies new onset

urinary or bowel incontinence.  


MSK: Pt denies myalgia, loss of strength or function in extremities. 


Neuro: Pt denies new onset weakness, paresthesias. 











- Related Data


                                Home Medications











 Medication  Instructions  Recorded  Confirmed


 


Levothyroxine Sodium [Synthroid] 75 mcg PO DAILY 12/23/15 01/16/19


 


Losartan Potassium 100 mg PO DAILY 12/23/15 01/16/19


 


Simvastatin [Zocor] 20 mg PO HS 09/25/18 01/16/19











                                    Allergies











Allergy/AdvReac Type Severity Reaction Status Date / Time


 


venom-honey bee Allergy  Anaphylaxis Verified 01/16/19 15:51





[bee venom (honey bee)]     


 


aspirin AdvReac  severe Verified 01/16/19 15:51





   stomach  





   pain  














Review of Systems


ROS Statement: 


Those systems with pertinent positive or pertinent negative responses have been 

documented in the HPI.





ROS Other: All systems not noted in ROS Statement are negative.





Past Medical History


Past Medical History: Hyperlipidemia, Hypertension


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


History of Any Multi-Drug Resistant Organisms: None Reported


Past Surgical History: Appendectomy, Hysterectomy, Orthopedic Surgery


Past Psychological History: No Psychological Hx Reported


Smoking Status: Never smoker


Past Alcohol Use History: None Reported


Past Drug Use History: None Reported





- Past Family History


  ** Father


Additional Family Medical History / Comment(s): lung





  ** Mother


Additional Family Medical History / Comment(s): pancreas,liver





General Exam





- General Exam Comments


Initial Comments: 





Constitutional: NAD, AOX3, Pt has pleasant affect. 


HEENT: NC/AT, trachea midline, neck supple, no lymphadenopathy. External ears 

appear normal, without discharge. Mucous membranes moist. Eyes PERRLA, EOM 

intact. There is no scleral icterus. No pallor noted. 


Cardiopulmonary: RRR, no murmurs, rubs or gallops, no JVD noted. Lungs CTAB in 

anterior and posterior fields. No peripheral edema. 


Abdominal exam: Abdomen soft and non-distended. Abdomen non-tender to palpation 

in all 4 quadrants. Bowel sounds active in LLQ. No hepatosplenomegaly. No 

ecchymosis


Neuro: CN II-XII grossly intact. No nuchal rigidity.  


MSK: No posterior calf tenderness bilaterally, homans sign negative bilaterally.

Posterior tibialis and radial pulse +2 bilaterally. Sensation intact in upper 

and lower extremities. Full active ROM in upper and lower extremities, 5/5 

stregnth. 








Limitations: no limitations





Course


                                   Vital Signs











  11/24/20 11/24/20 11/25/20





  20:53 23:26 01:07


 


Temperature 100.7 F H 101.2 F H 97.6 F


 


Pulse Rate 90 67 60


 


Respiratory 24 16 18





Rate   


 


Blood Pressure 178/66 179/65 151/73


 


O2 Sat by Pulse 96 95 95





Oximetry   














Medical Decision Making





- Medical Decision Making








74-year-old female patient to ED for cough fever lasts 2 days.  Vital signs 

displayed here patient is restrained..  Physical exam negative for acute 

pathology.  Lungs are clear.  As history negative for acute disease. Coronavirus

test is positive.  Patient discharge and close outpatient follow-up and strict 

return precautions.  Case discussed with Dr. Morales. 








- Lab Data


Result diagrams: 


                                 11/24/20 21:23





                                 11/24/20 21:23


                                   Lab Results











  11/24/20 11/24/20 11/24/20 Range/Units





  21:23 21:23 21:23 


 


WBC  4.6    (3.8-10.6)  k/uL


 


RBC  4.13    (3.80-5.40)  m/uL


 


Hgb  12.8    (11.4-16.0)  gm/dL


 


Hct  38.2    (34.0-46.0)  %


 


MCV  92.5    (80.0-100.0)  fL


 


MCH  30.9    (25.0-35.0)  pg


 


MCHC  33.5    (31.0-37.0)  g/dL


 


RDW  13.0    (11.5-15.5)  %


 


Plt Count  125 L    (150-450)  k/uL


 


MPV  9.1    


 


Neutrophils %  78    %


 


Lymphocytes %  13    %


 


Monocytes %  7    %


 


Eosinophils %  0    %


 


Basophils %  1    %


 


Neutrophils #  3.5    (1.3-7.7)  k/uL


 


Lymphocytes #  0.6 L    (1.0-4.8)  k/uL


 


Monocytes #  0.3    (0-1.0)  k/uL


 


Eosinophils #  0.0    (0-0.7)  k/uL


 


Basophils #  0.1    (0-0.2)  k/uL


 


Sodium   137   (137-145)  mmol/L


 


Potassium   4.5   (3.5-5.1)  mmol/L


 


Chloride   104   ()  mmol/L


 


Carbon Dioxide   22   (22-30)  mmol/L


 


Anion Gap   11   mmol/L


 


BUN   17   (7-17)  mg/dL


 


Creatinine   0.90   (0.52-1.04)  mg/dL


 


Est GFR (CKD-EPI)AfAm   73   (>60 ml/min/1.73 sqM)  


 


Est GFR (CKD-EPI)NonAf   63   (>60 ml/min/1.73 sqM)  


 


Glucose   85   (74-99)  mg/dL


 


Plasma Lactic Acid Daniel    1.3  (0.7-2.0)  mmol/L


 


Calcium   9.2   (8.4-10.2)  mg/dL


 


Magnesium   1.9   (1.6-2.3)  mg/dL


 


Total Bilirubin   1.2   (0.2-1.3)  mg/dL


 


AST   35   (14-36)  U/L


 


ALT   20   (4-34)  U/L


 


Alkaline Phosphatase   64   ()  U/L


 


Lactate Dehydrogenase   746 H   (313-618)  U/L


 


C-Reactive Protein   15.2 H   (<10.0)  mg/L


 


Total Protein   7.7   (6.3-8.2)  g/dL


 


Albumin   4.3   (3.5-5.0)  g/dL


 


Coronavirus (PCR)     (Not Detectd)  


 


Influenza Type A RNA     (Not Detectd)  


 


Influenza Type B (PCR)     (Not Detectd)  














  11/24/20 Range/Units





  21:23 


 


WBC   (3.8-10.6)  k/uL


 


RBC   (3.80-5.40)  m/uL


 


Hgb   (11.4-16.0)  gm/dL


 


Hct   (34.0-46.0)  %


 


MCV   (80.0-100.0)  fL


 


MCH   (25.0-35.0)  pg


 


MCHC   (31.0-37.0)  g/dL


 


RDW   (11.5-15.5)  %


 


Plt Count   (150-450)  k/uL


 


MPV   


 


Neutrophils %   %


 


Lymphocytes %   %


 


Monocytes %   %


 


Eosinophils %   %


 


Basophils %   %


 


Neutrophils #   (1.3-7.7)  k/uL


 


Lymphocytes #   (1.0-4.8)  k/uL


 


Monocytes #   (0-1.0)  k/uL


 


Eosinophils #   (0-0.7)  k/uL


 


Basophils #   (0-0.2)  k/uL


 


Sodium   (137-145)  mmol/L


 


Potassium   (3.5-5.1)  mmol/L


 


Chloride   ()  mmol/L


 


Carbon Dioxide   (22-30)  mmol/L


 


Anion Gap   mmol/L


 


BUN   (7-17)  mg/dL


 


Creatinine   (0.52-1.04)  mg/dL


 


Est GFR (CKD-EPI)AfAm   (>60 ml/min/1.73 sqM)  


 


Est GFR (CKD-EPI)NonAf   (>60 ml/min/1.73 sqM)  


 


Glucose   (74-99)  mg/dL


 


Plasma Lactic Acid Daniel   (0.7-2.0)  mmol/L


 


Calcium   (8.4-10.2)  mg/dL


 


Magnesium   (1.6-2.3)  mg/dL


 


Total Bilirubin   (0.2-1.3)  mg/dL


 


AST   (14-36)  U/L


 


ALT   (4-34)  U/L


 


Alkaline Phosphatase   ()  U/L


 


Lactate Dehydrogenase   (313-618)  U/L


 


C-Reactive Protein   (<10.0)  mg/L


 


Total Protein   (6.3-8.2)  g/dL


 


Albumin   (3.5-5.0)  g/dL


 


Coronavirus (PCR)  Detected A  (Not Detectd)  


 


Influenza Type A RNA  Not Detected  (Not Detectd)  


 


Influenza Type B (PCR)  Not Detected  (Not Detectd)  














- EKG Data


-: EKG Interpreted by Me (and Dr. Morales )


EKG Comments: 


Ventricular rate 59, TN interval 196, , QT//433.  Sinus any 

cardiac, left axis deviation, right bundle branch block.  Possible inferior 

infarct age extremities.  No significant change from prior.  No concern for 

acute ischemia.








Disposition


Clinical Impression: 


 COVID-19





Disposition: HOME SELF-CARE


Condition: Stable


Instructions (If sedation given, give patient instructions):  Upper Respiratory 

Infection (ED)


Additional Instructions: 


Follow up with PCP tomorrow. Recommend taking a multivitamin. Use tylenol for 

fever. Return to ED with any worsening symptoms. 


Is patient prescribed a controlled substance at d/c from ED?: No


Referrals: 


Akil Amaral MD [Primary Care Provider] - 1-2 days

## 2020-12-03 ENCOUNTER — HOSPITAL ENCOUNTER (INPATIENT)
Dept: HOSPITAL 47 - EC | Age: 75
LOS: 3 days | Discharge: HOME HEALTH | DRG: 308 | End: 2020-12-06
Attending: INTERNAL MEDICINE | Admitting: INTERNAL MEDICINE
Payer: MEDICARE

## 2020-12-03 DIAGNOSIS — E03.9: ICD-10-CM

## 2020-12-03 DIAGNOSIS — Z79.899: ICD-10-CM

## 2020-12-03 DIAGNOSIS — U07.1: ICD-10-CM

## 2020-12-03 DIAGNOSIS — Z86.19: ICD-10-CM

## 2020-12-03 DIAGNOSIS — Z90.710: ICD-10-CM

## 2020-12-03 DIAGNOSIS — E78.5: ICD-10-CM

## 2020-12-03 DIAGNOSIS — Z98.890: ICD-10-CM

## 2020-12-03 DIAGNOSIS — G71.00: ICD-10-CM

## 2020-12-03 DIAGNOSIS — Z79.890: ICD-10-CM

## 2020-12-03 DIAGNOSIS — I48.0: Primary | ICD-10-CM

## 2020-12-03 DIAGNOSIS — I08.3: ICD-10-CM

## 2020-12-03 DIAGNOSIS — Z91.030: ICD-10-CM

## 2020-12-03 DIAGNOSIS — I45.10: ICD-10-CM

## 2020-12-03 DIAGNOSIS — Z90.49: ICD-10-CM

## 2020-12-03 DIAGNOSIS — I16.0: ICD-10-CM

## 2020-12-03 DIAGNOSIS — J12.89: ICD-10-CM

## 2020-12-03 DIAGNOSIS — I10: ICD-10-CM

## 2020-12-03 DIAGNOSIS — Z88.6: ICD-10-CM

## 2020-12-03 LAB
ALBUMIN SERPL-MCNC: 3.6 G/DL (ref 3.5–5)
ALP SERPL-CCNC: 63 U/L (ref 38–126)
ALT SERPL-CCNC: 32 U/L (ref 4–34)
ANION GAP SERPL CALC-SCNC: 8 MMOL/L
APTT BLD: 23.1 SEC (ref 22–30)
AST SERPL-CCNC: 40 U/L (ref 14–36)
BASOPHILS # BLD AUTO: 0 K/UL (ref 0–0.2)
BASOPHILS NFR BLD AUTO: 1 %
BUN SERPL-SCNC: 15 MG/DL (ref 7–17)
CALCIUM SPEC-MCNC: 8.7 MG/DL (ref 8.4–10.2)
CHLORIDE SERPL-SCNC: 112 MMOL/L (ref 98–107)
CO2 SERPL-SCNC: 22 MMOL/L (ref 22–30)
EOSINOPHIL # BLD AUTO: 0.1 K/UL (ref 0–0.7)
EOSINOPHIL NFR BLD AUTO: 3 %
ERYTHROCYTE [DISTWIDTH] IN BLOOD BY AUTOMATED COUNT: 4.35 M/UL (ref 3.8–5.4)
ERYTHROCYTE [DISTWIDTH] IN BLOOD: 13.2 % (ref 11.5–15.5)
FERRITIN SERPL-MCNC: 444.7 NG/ML (ref 10–291)
GLUCOSE SERPL-MCNC: 115 MG/DL (ref 74–99)
HCT VFR BLD AUTO: 39.7 % (ref 34–46)
HGB BLD-MCNC: 13 GM/DL (ref 11.4–16)
INR PPP: 1.1 (ref ?–1.2)
LDH SPEC-CCNC: 632 U/L (ref 313–618)
LYMPHOCYTES # SPEC AUTO: 2 K/UL (ref 1–4.8)
LYMPHOCYTES NFR SPEC AUTO: 49 %
MAGNESIUM SPEC-SCNC: 1.7 MG/DL (ref 1.6–2.3)
MCH RBC QN AUTO: 29.9 PG (ref 25–35)
MCHC RBC AUTO-ENTMCNC: 32.7 G/DL (ref 31–37)
MCV RBC AUTO: 91.3 FL (ref 80–100)
MONOCYTES # BLD AUTO: 0.2 K/UL (ref 0–1)
MONOCYTES NFR BLD AUTO: 5 %
NEUTROPHILS # BLD AUTO: 1.7 K/UL (ref 1.3–7.7)
NEUTROPHILS NFR BLD AUTO: 40 %
PLATELET # BLD AUTO: 182 K/UL (ref 150–450)
POTASSIUM SERPL-SCNC: 3.6 MMOL/L (ref 3.5–5.1)
PROT SERPL-MCNC: 6.5 G/DL (ref 6.3–8.2)
PT BLD: 11.1 SEC (ref 9–12)
SODIUM SERPL-SCNC: 142 MMOL/L (ref 137–145)
WBC # BLD AUTO: 4.1 K/UL (ref 3.8–10.6)

## 2020-12-03 PROCEDURE — 84100 ASSAY OF PHOSPHORUS: CPT

## 2020-12-03 PROCEDURE — 83880 ASSAY OF NATRIURETIC PEPTIDE: CPT

## 2020-12-03 PROCEDURE — 85730 THROMBOPLASTIN TIME PARTIAL: CPT

## 2020-12-03 PROCEDURE — 85379 FIBRIN DEGRADATION QUANT: CPT

## 2020-12-03 PROCEDURE — 85384 FIBRINOGEN ACTIVITY: CPT

## 2020-12-03 PROCEDURE — 83615 LACTATE (LD) (LDH) ENZYME: CPT

## 2020-12-03 PROCEDURE — 84443 ASSAY THYROID STIM HORMONE: CPT

## 2020-12-03 PROCEDURE — 99285 EMERGENCY DEPT VISIT HI MDM: CPT

## 2020-12-03 PROCEDURE — 96365 THER/PROPH/DIAG IV INF INIT: CPT

## 2020-12-03 PROCEDURE — 96368 THER/DIAG CONCURRENT INF: CPT

## 2020-12-03 PROCEDURE — 36415 COLL VENOUS BLD VENIPUNCTURE: CPT

## 2020-12-03 PROCEDURE — 84484 ASSAY OF TROPONIN QUANT: CPT

## 2020-12-03 PROCEDURE — 84145 PROCALCITONIN (PCT): CPT

## 2020-12-03 PROCEDURE — 96366 THER/PROPH/DIAG IV INF ADDON: CPT

## 2020-12-03 PROCEDURE — 71045 X-RAY EXAM CHEST 1 VIEW: CPT

## 2020-12-03 PROCEDURE — 83735 ASSAY OF MAGNESIUM: CPT

## 2020-12-03 PROCEDURE — 80053 COMPREHEN METABOLIC PANEL: CPT

## 2020-12-03 PROCEDURE — 93005 ELECTROCARDIOGRAM TRACING: CPT

## 2020-12-03 PROCEDURE — 87040 BLOOD CULTURE FOR BACTERIA: CPT

## 2020-12-03 PROCEDURE — 85025 COMPLETE CBC W/AUTO DIFF WBC: CPT

## 2020-12-03 PROCEDURE — 85610 PROTHROMBIN TIME: CPT

## 2020-12-03 PROCEDURE — 86140 C-REACTIVE PROTEIN: CPT

## 2020-12-03 PROCEDURE — 82728 ASSAY OF FERRITIN: CPT

## 2020-12-03 PROCEDURE — 93306 TTE W/DOPPLER COMPLETE: CPT

## 2020-12-03 PROCEDURE — 83605 ASSAY OF LACTIC ACID: CPT

## 2020-12-03 RX ADMIN — HEPARIN SODIUM SCH MLS/HR: 10000 INJECTION, SOLUTION INTRAVENOUS at 11:15

## 2020-12-03 RX ADMIN — CEFAZOLIN SCH MLS/HR: 330 INJECTION, POWDER, FOR SOLUTION INTRAMUSCULAR; INTRAVENOUS at 11:21

## 2020-12-03 NOTE — ED
General Adult HPI





- General


Chief complaint: Shortness of Breath


Stated complaint: JOMAR, +Covid


Time Seen by Provider: 12/03/20 08:51


Source: EMS, RN notes reviewed


Mode of arrival: EMS


Limitations: no limitations





- History of Present Illness


Initial comments: 





74-year-old female with a past medical history of hyperlipidemia, hypertension 

presents to the emergency room for a chief complaint of shortness of breath.  

Patient was diagnosed with Covid 9 days ago.  The facility she lives and had.  

Patient states she has had symptoms of weakness and a slight cough but no other 

real symptoms.  However today patient developed shortness of breath and an ambu

dayami was called.  She denies fevers or chills.  Denies chest pain.  Patient 

denies blood thinner usage.Patient has no other complaints at this time 

including chest pain, abdominal pain, nausea or vomiting, headache, or visual 

changes.





- Related Data


                                Home Medications











 Medication  Instructions  Recorded  Confirmed


 


Levothyroxine Sodium [Synthroid] 75 mcg PO DAILY 12/23/15 12/03/20


 


Losartan Potassium 100 mg PO DAILY 12/23/15 12/03/20


 


Simvastatin [Zocor] 20 mg PO DAILY 09/25/18 12/03/20











                                    Allergies











Allergy/AdvReac Type Severity Reaction Status Date / Time


 


venom-honey bee Allergy  Anaphylaxis Verified 12/03/20 09:14





[bee venom (honey bee)]     


 


aspirin AdvReac  severe Verified 12/03/20 09:14





   stomach  





   pain  














Review of Systems


ROS Statement: 


Those systems with pertinent positive or pertinent negative responses have been 

documented in the HPI.





ROS Other: All systems not noted in ROS Statement are negative.





Past Medical History


Past Medical History: Hyperlipidemia, Hypertension


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


History of Any Multi-Drug Resistant Organisms: None Reported


Past Surgical History: Appendectomy, Hysterectomy, Orthopedic Surgery


Past Psychological History: No Psychological Hx Reported


Smoking Status: Never smoker


Past Alcohol Use History: None Reported


Past Drug Use History: None Reported





- Past Family History


  ** Father


Additional Family Medical History / Comment(s): lung





  ** Mother


Additional Family Medical History / Comment(s): pancreas,liver





General Exam


Limitations: no limitations


General appearance: alert, in no apparent distress


Head exam: Present: atraumatic, normocephalic, normal inspection


Eye exam: Present: normal appearance, PERRL, EOMI.  Absent: scleral icterus, 

conjunctival injection, periorbital swelling


ENT exam: Present: normal exam, mucous membranes moist


Neck exam: Present: normal inspection, full ROM


Respiratory exam: Present: normal lung sounds bilaterally.  Absent: respiratory 

distress, wheezes, rales, rhonchi, stridor


Cardiovascular Exam: Present: tachycardia, irregular rhythm


GI/Abdominal exam: Present: soft, normal bowel sounds.  Absent: distended, 

tenderness, guarding, rebound, rigid


Neurological exam: Present: alert, oriented X3





Course


                                   Vital Signs











  12/03/20 12/03/20 12/03/20





  08:53 08:58 09:00


 


Temperature 97.5 F L  


 


Pulse Rate 102 H 112 H 110 H


 


Respiratory 18 17 18





Rate   


 


Blood Pressure 119/71  


 


O2 Sat by Pulse 99 98 98





Oximetry   














  12/03/20 12/03/20 12/03/20





  09:08 09:30 09:39


 


Temperature   


 


Pulse Rate  111 H 116 H


 


Respiratory 18 18 18





Rate   


 


Blood Pressure  119/71 101/72


 


O2 Sat by Pulse  99 100





Oximetry   














  12/03/20





  10:00


 


Temperature 


 


Pulse Rate 116 H


 


Respiratory 16





Rate 


 


Blood Pressure 101/72


 


O2 Sat by Pulse 99





Oximetry 














EKG Findings





- EKG Comments:


EKG Findings:: Atrial fibrillation, ventricular rate 112, QRS duration 158, QTC 

548, right bundle branch block





Medical Decision Making





- Medical Decision Making





Patient presents with tachycardia up to 130s.  EKG does reveal a new onset 

atrial fibrillation patient reports she has never had A. fib nor ever had blood 

thinners.  Heart rate was monitored and was assisting in the 130s.  Low-dose 

Cardizem was started. CBC CMP unremarkable.  Patient does have a slightly 

elevated troponin at 0.036.  Chest x-ray unremarkable.  Patient will be started 

on heparin.  Patient will be admitted for new onset atrial fibrillation which is

likely the cause of her shortness of breath.





- Lab Data


Result diagrams: 


                                 12/03/20 09:18





                                 12/03/20 09:18


                                   Lab Results











  12/03/20 12/03/20 12/03/20 Range/Units





  09:18 09:18 09:18 


 


WBC  4.1    (3.8-10.6)  k/uL


 


RBC  4.35    (3.80-5.40)  m/uL


 


Hgb  13.0    (11.4-16.0)  gm/dL


 


Hct  39.7    (34.0-46.0)  %


 


MCV  91.3    (80.0-100.0)  fL


 


MCH  29.9    (25.0-35.0)  pg


 


MCHC  32.7    (31.0-37.0)  g/dL


 


RDW  13.2    (11.5-15.5)  %


 


Plt Count  182    (150-450)  k/uL


 


MPV  10.1    


 


Neutrophils %  40    %


 


Lymphocytes %  49    %


 


Monocytes %  5    %


 


Eosinophils %  3    %


 


Basophils %  1    %


 


Neutrophils #  1.7    (1.3-7.7)  k/uL


 


Lymphocytes #  2.0    (1.0-4.8)  k/uL


 


Monocytes #  0.2    (0-1.0)  k/uL


 


Eosinophils #  0.1    (0-0.7)  k/uL


 


Basophils #  0.0    (0-0.2)  k/uL


 


PT   11.1   (9.0-12.0)  sec


 


INR   1.1   (<1.2)  


 


APTT   23.1   (22.0-30.0)  sec


 


Sodium    142  (137-145)  mmol/L


 


Potassium    3.6  (3.5-5.1)  mmol/L


 


Chloride    112 H  ()  mmol/L


 


Carbon Dioxide    22  (22-30)  mmol/L


 


Anion Gap    8  mmol/L


 


BUN    15  (7-17)  mg/dL


 


Creatinine    1.10 H  (0.52-1.04)  mg/dL


 


Est GFR (CKD-EPI)AfAm    57  (>60 ml/min/1.73 sqM)  


 


Est GFR (CKD-EPI)NonAf    49  (>60 ml/min/1.73 sqM)  


 


Glucose    115 H  (74-99)  mg/dL


 


Plasma Lactic Acid Daniel     (0.7-2.0)  mmol/L


 


Calcium    8.7  (8.4-10.2)  mg/dL


 


Magnesium    1.7  (1.6-2.3)  mg/dL


 


Total Bilirubin    1.2  (0.2-1.3)  mg/dL


 


AST    40 H  (14-36)  U/L


 


ALT    32  (4-34)  U/L


 


Alkaline Phosphatase    63  ()  U/L


 


Lactate Dehydrogenase    632 H  (313-618)  U/L


 


Troponin I     (0.000-0.034)  ng/mL


 


Total Protein    6.5  (6.3-8.2)  g/dL


 


Albumin    3.6  (3.5-5.0)  g/dL














  12/03/20 12/03/20 Range/Units





  09:18 09:18 


 


WBC    (3.8-10.6)  k/uL


 


RBC    (3.80-5.40)  m/uL


 


Hgb    (11.4-16.0)  gm/dL


 


Hct    (34.0-46.0)  %


 


MCV    (80.0-100.0)  fL


 


MCH    (25.0-35.0)  pg


 


MCHC    (31.0-37.0)  g/dL


 


RDW    (11.5-15.5)  %


 


Plt Count    (150-450)  k/uL


 


MPV    


 


Neutrophils %    %


 


Lymphocytes %    %


 


Monocytes %    %


 


Eosinophils %    %


 


Basophils %    %


 


Neutrophils #    (1.3-7.7)  k/uL


 


Lymphocytes #    (1.0-4.8)  k/uL


 


Monocytes #    (0-1.0)  k/uL


 


Eosinophils #    (0-0.7)  k/uL


 


Basophils #    (0-0.2)  k/uL


 


PT    (9.0-12.0)  sec


 


INR    (<1.2)  


 


APTT    (22.0-30.0)  sec


 


Sodium    (137-145)  mmol/L


 


Potassium    (3.5-5.1)  mmol/L


 


Chloride    ()  mmol/L


 


Carbon Dioxide    (22-30)  mmol/L


 


Anion Gap    mmol/L


 


BUN    (7-17)  mg/dL


 


Creatinine    (0.52-1.04)  mg/dL


 


Est GFR (CKD-EPI)AfAm    (>60 ml/min/1.73 sqM)  


 


Est GFR (CKD-EPI)NonAf    (>60 ml/min/1.73 sqM)  


 


Glucose    (74-99)  mg/dL


 


Plasma Lactic Acid Daniel  1.9   (0.7-2.0)  mmol/L


 


Calcium    (8.4-10.2)  mg/dL


 


Magnesium    (1.6-2.3)  mg/dL


 


Total Bilirubin    (0.2-1.3)  mg/dL


 


AST    (14-36)  U/L


 


ALT    (4-34)  U/L


 


Alkaline Phosphatase    ()  U/L


 


Lactate Dehydrogenase    (313-618)  U/L


 


Troponin I   0.036 H*  (0.000-0.034)  ng/mL


 


Total Protein    (6.3-8.2)  g/dL


 


Albumin    (3.5-5.0)  g/dL














Disposition


Clinical Impression: 


 Elevated troponin, New onset atrial fibrillation, COVID-19





Disposition: ADMITTED AS IP TO THIS HOSP


Is patient prescribed a controlled substance at d/c from ED?: No


Referrals: 


Akil Amaral MD [Primary Care Provider] - 1-2 days


Time of Disposition: 10:50

## 2020-12-03 NOTE — XR
EXAMINATION TYPE: XR chest 1V portable

 

DATE OF EXAM: 12/3/2020

 

COMPARISON: Prior chest x-ray 11/24/2020

 

HISTORY: Shortness of breath, suspected Covid pneumonia

 

TECHNIQUE: Single frontal view of the chest is obtained.

 

FINDINGS:  Patient is rotated. There is no evident airspace disease, pneumothorax, or pleural effusio
n. Heart size may be accentuated by rotation but is stable, suspect spinal curvature. Aorta is dense.
 Pulmonary vascularity and alexander are unchanged.

 

IMPRESSION:  No acute process, stable heart size which appears enlarged.

## 2020-12-03 NOTE — P.HPIM
History of Present Illness


H&P Date: 12/03/20


Chief Complaint: sob





74-year-old female with a past medical history of hyperlipidemia, hypertension 

presents to the emergency room for a chief complaint of shortness of breath.  

Patient was diagnosed with Covid 9 days ago. Patient states she has had symptoms

of weakness and a slight cough productive of brownish phlegm but no other real 

symptoms.  She states she might have had a slight fever.  No chills.  Denies 

chest pain.  No history of falls. No abdominal pain, nausea or vomiting, 

headache, or visual changes.





Evaluation in the emergency department revealed elevated heart rate in the 110s,

blood pressure is stable.  Patient was not tachypneic.  Labs showed no leuko

cytosis.  Creatinine 1.1, AST 40, ALT normal.  , troponin 0.036.  Chest 

x-ray did not show any acute cardiopulmonary disease.  EKG showed right bundle-

branch block, atrial fibrillation with rapid ventricular response.





Review of Systems





Complete review of system performed, pertinent positives per HPI, otherwise 

negative





Past Medical History


Past Medical History: Hyperlipidemia, Hypertension


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


History of Any Multi-Drug Resistant Organisms: None Reported


Past Surgical History: Appendectomy, Hysterectomy, Orthopedic Surgery


Past Psychological History: No Psychological Hx Reported


Smoking Status: Never smoker


Past Alcohol Use History: None Reported


Past Drug Use History: None Reported





- Past Family History


  ** Father


Additional Family Medical History / Comment(s): lung





  ** Mother


Additional Family Medical History / Comment(s): pancreas,liver





Medications and Allergies


                                Home Medications











 Medication  Instructions  Recorded  Confirmed  Type


 


Levothyroxine Sodium [Synthroid] 75 mcg PO DAILY 12/23/15 12/03/20 History


 


Losartan Potassium 100 mg PO DAILY 12/23/15 12/03/20 History


 


Simvastatin [Zocor] 20 mg PO DAILY 09/25/18 12/03/20 History








                                    Allergies











Allergy/AdvReac Type Severity Reaction Status Date / Time


 


venom-honey bee Allergy  Anaphylaxis Verified 12/03/20 09:14





[bee venom (honey bee)]     


 


aspirin AdvReac  severe Verified 12/03/20 09:14





   stomach  





   pain  














Physical Exam


Vitals: 


                                   Vital Signs











  Temp Pulse Resp BP Pulse Ox


 


 12/03/20 10:00   116 H  16  101/72  99


 


 12/03/20 09:39   116 H  18  101/72  100


 


 12/03/20 09:30   111 H  18  119/71  99


 


 12/03/20 09:08    18  


 


 12/03/20 09:00   110 H  18   98


 


 12/03/20 08:58   112 H  17   98


 


 12/03/20 08:53  97.5 F L  102 H  18  119/71  99








                                Intake and Output











 12/02/20 12/03/20 12/03/20





 22:59 06:59 14:59


 


Other:   


 


  Weight   90.718 kg














Constitutional: Appears ill, conversant, pleasant


Eyes:Anicteric sclerae, moist conjunctiva, no lid-lag, PERRLA, 


ENMT: Oropharynx clear, no erythema, exudates


Neck: Supple, FROM, no masses, or JVD, No carotid bruits, No thyromegaly


Lungs: Clear to auscultation, Clear to percussion, Normal respiratory effort, no

accessory muscle use 


Cardiovascular: Tachycardic, irregularly irregular, No murmurs, gallops, or 

rubs, No peripheral edema


Abdominal: Soft, Nontender, no guarding, rebound or rigidity, Normoactive bowel 

sounds, No hepatomegaly, No splenomegaly, No palpable mass 


Skin: Normal temperature, tone, texture, turgor, no induration, No subcutaneous 

nodules, No rash, lesions, No ulcers


Extremities: No digital cyanosis, No clubbing, Pedal pulses intact and 

symmetrical, Radial pulses intact and symmetrical, No calf tenderness 


Psychiatric: Alert and oriented to person, place and time, appropriate affect, 

intact judgement         


Neuro: Muscles Strength 5/5 in all 4 extremities, Sensation to light touch 

grossly present throughout, Cranial nerves II-XII grossly intact, no focal 

sensory deficits








Results


CBC & Chem 7: 


                                 12/03/20 09:18





                                 12/03/20 09:18


Labs: 


                  Abnormal Lab Results - Last 24 Hours (Table)











  12/03/20 12/03/20 Range/Units





  09:18 09:18 


 


Chloride  112 H   ()  mmol/L


 


Creatinine  1.10 H   (0.52-1.04)  mg/dL


 


Glucose  115 H   (74-99)  mg/dL


 


AST  40 H   (14-36)  U/L


 


Lactate Dehydrogenase  632 H   (313-618)  U/L


 


Troponin I   0.036 H*  (0.000-0.034)  ng/mL














Assessment and Plan


Plan: 





Atrial fibrillation with rapid ventricular response


Admit to telemetry unit


Check TSH


Monitor lytes daily


Cardizem drip


Heparin drip


Could be related to covid 19





Elevated troponins


Cycle troponins


Echo


Consult cardiology as above





Recent covid 19 infection


Unclear if new onset a-fib is related to cardiac complications from COVID 


No evidence of pneumonia on CXR


Follow inflammatory markers.


No treatment for COVID indicated at this point.





Hypertension


Hyperlipidemia


Hypothyroidism


All stable


Resume meds





Patient will be admitted to inpatient, expected length of stay more than two 

midnights.





Anticipated discharge: 2-3 days

## 2020-12-04 LAB
ALBUMIN SERPL-MCNC: 3.4 G/DL (ref 3.5–5)
ALP SERPL-CCNC: 62 U/L (ref 38–126)
ALT SERPL-CCNC: 29 U/L (ref 4–34)
ANION GAP SERPL CALC-SCNC: 4 MMOL/L
APTT BLD: 63.1 SEC (ref 22–30)
AST SERPL-CCNC: 38 U/L (ref 14–36)
BASOPHILS # BLD AUTO: 0 K/UL (ref 0–0.2)
BASOPHILS NFR BLD AUTO: 1 %
BUN SERPL-SCNC: 12 MG/DL (ref 7–17)
CALCIUM SPEC-MCNC: 8.7 MG/DL (ref 8.4–10.2)
CHLORIDE SERPL-SCNC: 115 MMOL/L (ref 98–107)
CO2 SERPL-SCNC: 21 MMOL/L (ref 22–30)
D DIMER PPP FEU-MCNC: 1.25 MG/L FEU (ref ?–0.6)
EOSINOPHIL # BLD AUTO: 0.1 K/UL (ref 0–0.7)
EOSINOPHIL NFR BLD AUTO: 2 %
ERYTHROCYTE [DISTWIDTH] IN BLOOD BY AUTOMATED COUNT: 3.8 M/UL (ref 3.8–5.4)
ERYTHROCYTE [DISTWIDTH] IN BLOOD: 13 % (ref 11.5–15.5)
FIBRINOGEN PPP-MCNC: 425 MG/DL (ref 200–500)
GLUCOSE SERPL-MCNC: 83 MG/DL (ref 74–99)
HCT VFR BLD AUTO: 35.3 % (ref 34–46)
HGB BLD-MCNC: 11.8 GM/DL (ref 11.4–16)
LDH SPEC-CCNC: 616 U/L (ref 313–618)
LYMPHOCYTES # SPEC AUTO: 2.1 K/UL (ref 1–4.8)
LYMPHOCYTES NFR SPEC AUTO: 45 %
MAGNESIUM SPEC-SCNC: 1.7 MG/DL (ref 1.6–2.3)
MCH RBC QN AUTO: 31.1 PG (ref 25–35)
MCHC RBC AUTO-ENTMCNC: 33.4 G/DL (ref 31–37)
MCV RBC AUTO: 93 FL (ref 80–100)
MONOCYTES # BLD AUTO: 0.3 K/UL (ref 0–1)
MONOCYTES NFR BLD AUTO: 7 %
NEUTROPHILS # BLD AUTO: 2.1 K/UL (ref 1.3–7.7)
NEUTROPHILS NFR BLD AUTO: 44 %
PLATELET # BLD AUTO: 153 K/UL (ref 150–450)
POTASSIUM SERPL-SCNC: 3.8 MMOL/L (ref 3.5–5.1)
PROT SERPL-MCNC: 6.3 G/DL (ref 6.3–8.2)
SODIUM SERPL-SCNC: 140 MMOL/L (ref 137–145)
WBC # BLD AUTO: 4.8 K/UL (ref 3.8–10.6)

## 2020-12-04 RX ADMIN — CEFAZOLIN SCH MLS/HR: 330 INJECTION, POWDER, FOR SOLUTION INTRAMUSCULAR; INTRAVENOUS at 14:19

## 2020-12-04 RX ADMIN — CEFAZOLIN SCH: 330 INJECTION, POWDER, FOR SOLUTION INTRAMUSCULAR; INTRAVENOUS at 02:23

## 2020-12-04 RX ADMIN — LOSARTAN POTASSIUM SCH MG: 50 TABLET, FILM COATED ORAL at 07:54

## 2020-12-04 RX ADMIN — LEVOTHYROXINE SODIUM SCH MCG: 75 TABLET ORAL at 06:27

## 2020-12-04 RX ADMIN — APIXABAN SCH MG: 5 TABLET, FILM COATED ORAL at 21:15

## 2020-12-04 RX ADMIN — HEPARIN SODIUM SCH: 10000 INJECTION, SOLUTION INTRAVENOUS at 14:29

## 2020-12-04 RX ADMIN — APIXABAN SCH MG: 5 TABLET, FILM COATED ORAL at 14:19

## 2020-12-04 RX ADMIN — ATORVASTATIN CALCIUM SCH MG: 20 TABLET, FILM COATED ORAL at 07:54

## 2020-12-04 NOTE — P.PN
Subjective


Progress Note Date: 12/04/20


Principal diagnosis: 





sob





Patient states that she is feeling very tired and worn off today.  She is still 

having shortness of breath.  No chest pain, no dizziness or fevers.





Objective





- Vital Signs


Vital signs: 


                                   Vital Signs











Temp  97.5 F L  12/04/20 12:00


 


Pulse  61   12/04/20 12:00


 


Resp  20   12/04/20 12:00


 


BP  167/66   12/04/20 12:00


 


Pulse Ox  96   12/04/20 12:00








                                 Intake & Output











 12/03/20 12/04/20 12/04/20





 18:59 06:59 18:59


 


Intake Total  191.993 


 


Output Total  400 


 


Balance  -208.007 


 


Weight 90.718 kg 90.718 kg 


 


Intake:   


 


  Intake, IV Titration  191.993 





  Amount   


 


    Diltiazem 125 mg In  61.75 





    Sodium Chloride 0.9% 100   





    ml @ 5 MG/HR 5 mls/hr IV   





    .Q24H CHASE Rx#:[id number]   


 


    Heparin Sod,Pork in 0.45%  130.243 





    NaCl 25,000 unit In 0.45   





    % NaCl 1 250ml.bag @ 11.   





    023 UNITS/KG/HR 10 mls/hr   





    IV .Q24H CHASE Rx#:   





    [id number]   


 


Output:   


 


  Urine  400 


 


Other:   


 


  # Voids  1 1














- Exam











Constitutional: Appears ill, conversant, pleasant


Eyes:Anicteric sclerae, moist conjunctiva, no lid-lag, PERRLA, 


ENMT: Oropharynx clear, no erythema, exudates


Neck: Supple, FROM, no masses, or JVD, No carotid bruits, No thyromegaly


Lungs: Clear to auscultation, Clear to percussion, Normal respiratory effort, no

accessory muscle use 


Cardiovascular: Tachycardic, irregularly irregular, No murmurs, gallops, or 

rubs, No peripheral edema


Abdominal: Soft, Nontender, no guarding, rebound or rigidity, Normoactive bowel 

sounds, No hepatomegaly, No splenomegaly, No palpable mass 


Skin: Normal temperature, tone, texture, turgor, no induration, No subcutaneous 

nodules, No rash, lesions, No ulcers


Extremities: No digital cyanosis, No clubbing, Pedal pulses intact and 

symmetrical, Radial pulses intact and symmetrical, No calf tenderness 


Psychiatric: Alert and oriented to person, place and time, appropriate affect, 

intact judgement         


Neuro: Muscles Strength 5/5 in all 4 extremities, Sensation to light touch 

grossly present throughout, Cranial nerves II-XII grossly intact, no focal 

sensory deficits











- Labs


CBC & Chem 7: 


                                 12/04/20 05:42





                                 12/04/20 05:42


Labs: 


                  Abnormal Lab Results - Last 24 Hours (Table)











  12/03/20 12/03/20 12/03/20 Range/Units





  09:18 12:08 17:19 


 


APTT     (22.0-30.0)  sec


 


D-Dimer     (<0.60)  mg/L FEU


 


Chloride     ()  mmol/L


 


Carbon Dioxide     (22-30)  mmol/L


 


Ferritin  444.7 H    (10.0-291.0)  ng/mL


 


AST     (14-36)  U/L


 


Troponin I   0.040 H*  0.047 H*  (0.000-0.034)  ng/mL


 


Albumin     (3.5-5.0)  g/dL














  12/03/20 12/04/20 12/04/20 Range/Units





  17:19 01:20 05:42 


 


APTT  106.6 H*  86.0 H   (22.0-30.0)  sec


 


D-Dimer     (<0.60)  mg/L FEU


 


Chloride    115 H  ()  mmol/L


 


Carbon Dioxide    21 L  (22-30)  mmol/L


 


Ferritin     (10.0-291.0)  ng/mL


 


AST    38 H  (14-36)  U/L


 


Troponin I     (0.000-0.034)  ng/mL


 


Albumin    3.4 L  (3.5-5.0)  g/dL














  12/04/20 Range/Units





  05:42 


 


APTT  63.1 H  (22.0-30.0)  sec


 


D-Dimer  1.25 H  (<0.60)  mg/L FEU


 


Chloride   ()  mmol/L


 


Carbon Dioxide   (22-30)  mmol/L


 


Ferritin   (10.0-291.0)  ng/mL


 


AST   (14-36)  U/L


 


Troponin I   (0.000-0.034)  ng/mL


 


Albumin   (3.5-5.0)  g/dL








                      Microbiology - Last 24 Hours (Table)











 12/03/20 09:18 Blood Culture - Preliminary





 Blood    No Growth after 24 hours














Assessment and Plan


Plan: 





Atrial fibrillation with rapid ventricular response


Could be related to covid 19


TSH ok


Off cardizem drip, HR currently in the 50s-60s--repeat EKG


Heparin drip


Awaiting cardiology input


Echo showed mild concentric LVH, left atrial dilation as well as mild to 

moderate mitral regurgitation.





Elevated troponins


Possibly related to covid 19


Echo as above


Awaiting cardiology evaluation





Covid 19 infection


No evidence of pneumonia on CXR and patient not requiring oxygen


No treatment for COVID indicated at this point.





Hypertension


Hyperlipidemia


Hypothyroidism


All stable


Resume meds





General weakness


PT evaluation





Patient will be admitted to inpatient, expected length of stay more than two 

midnights.





Anticipated discharge: 1-2 days

## 2020-12-04 NOTE — P.CRDCN
History of Present Illness


History of present illness: 





HISTORY OF PRESENTING ILLNESS


This is a pleasant 74-year-old  female past medical history significant

for hypertension and dyslipidemia.  She denies prior history of coronary artery 

disease and does not follow with a cardiologist for any reason. We have been 

asked to see in consultation for atrial fibrillation.  She was diagnosed with 

Covid 19 11/24.  She had been at home self quarantining and maintaining rather 

well until yesterday morning.  She woke up feeling extremely short of breath. 

She came to the ER for evaluation. EKG on arrival reveals atrial fibrillation 

with heart rate 112 with right bundle branch block.  On admission she was 

started on IV Cardizem infusion and subsequently converted to sinus mechanism.  

She continues to be on a heparin drip.  Through the night her heart rates were 

low in the 40s to 50s.  Currently 51 with a blood pressure of 198/80.  Currently

maintained on losartan 100 mg daily and atorvastatin 20 mg daily.  Laboratory 

data reviewed, CBC unremarkable, d-dimer 1.25, sodium 140, potassium 3.8, 

creatinine 0.82, magnesium 1.7, NT proBNP 1310, TSH 1.83, troponin 0.036, 0.040 

0.047.  Echocardiogram obtained reveals preserved LV systolic function with 

ejection fraction 55-60%, mild aortic regurgitation, mild to moderate mitral 

regurgitation and mild tricuspid regurgitation.





REVIEW OF SYSTEMS


At the time of my exam:


CONSTITUTIONAL: Denies fever or chills.


CARDIOVASCULAR: Denies chest pain, shortness of breath, orthopnea, PND or palpi

tations.


RESPIRATORY: Denies cough. 


GASTROINTESTINAL: Denies abdominal pain, diarrhea, constipation, nausea or 

vomiting.


MUSCULOSKELETAL: Denies myalgias.


NEUROLOGIC: Denies numbness, tingling or weakness.


ENDOCRINE: Denies fatigue, weight change,  polydipsia or polyurina.


GENITOURINARY: Denies burning, hematuria or urgency with micturation.


HEMATOLOGIC: Denies history of anemia or bleeding. 





PHYSICAL EXAMINATION


CONSTITUTIONAL: No apparent distress. 


No further physical exam was performed secondary to acute Covid 19 infection.





ASSESSMENT


New onset paroxysmal atrial fibrillation with rapid ventricular rates


COVID 19


Hypertension, uncontrolled


Dyslipidemia





PLAN


Discontinue heparin infusion and initiate Eliquis 5 mg twice a day.


Continue to monitor closely on telemetry.  His converted to sinus mechanism and 

maintaining persistent sinus bradycardia. No beta blockers were rate lowering a

gents at this time.


Agree with addition of amlodipine. 


Continue losartan as previously ordered. 


Thank you kindly for this consultation.





Nurse Practitioner note has been reviewed, I agree with a documented findings 

and plan of care.  Patient was seen and examined.











Past Medical History


Past Medical History: Hyperlipidemia, Hypertension


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


History of Any Multi-Drug Resistant Organisms: None Reported


Past Surgical History: Appendectomy, Hysterectomy, Orthopedic Surgery


Past Anesthesia/Blood Transfusion Reactions: No Reported Reaction


Past Psychological History: No Psychological Hx Reported


Smoking Status: Never smoker


Past Alcohol Use History: None Reported


Past Drug Use History: None Reported





- Past Family History


  ** Father


Additional Family Medical History / Comment(s): lung





  ** Mother


Additional Family Medical History / Comment(s): pancreas,liver





Medications and Allergies


                                Home Medications











 Medication  Instructions  Recorded  Confirmed  Type


 


Levothyroxine Sodium [Synthroid] 75 mcg PO DAILY 12/23/15 12/03/20 History


 


Losartan Potassium 100 mg PO DAILY 12/23/15 12/03/20 History


 


Simvastatin [Zocor] 20 mg PO DAILY 09/25/18 12/03/20 History








                                    Allergies











Allergy/AdvReac Type Severity Reaction Status Date / Time


 


venom-honey bee Allergy  Anaphylaxis Verified 12/03/20 09:14





[bee venom (honey bee)]     


 


aspirin AdvReac  severe Verified 12/03/20 09:14





   stomach  





   pain  














Physical Exam


Vitals: 


                                   Vital Signs











  Temp Pulse Pulse Resp BP BP Pulse Ox


 


 12/04/20 05:35    54 L    172/73 


 


 12/04/20 02:12  98.1 F   53 L  18   183/64  95


 


 12/03/20 23:12  97.6 F   47 L  18   176/69  100


 


 12/03/20 21:53  97.9 F   46 L  16   178/84  100


 


 12/03/20 17:00   57 L   16  153/78   99


 


 12/03/20 13:00   60   16  123/80   100


 


 12/03/20 12:14   46 L   18  136/84   100


 


 12/03/20 11:21   86   18  109/74   99


 


 12/03/20 10:00   116 H   16  101/72   99


 


 12/03/20 09:39   116 H   18  101/72   100


 


 12/03/20 09:30   111 H   18  119/71   99


 


 12/03/20 09:08     18   


 


 12/03/20 09:00   110 H   18    98


 


 12/03/20 08:58   112 H   17    98


 


 12/03/20 08:53  97.5 F L  102 H   18  119/71   99








                                Intake and Output











 12/03/20 12/04/20 12/04/20





 22:59 06:59 14:59


 


Intake Total 149.417 42.576 


 


Output Total  400 


 


Balance 149.417 -357.424 


 


Intake:   


 


  Intake, IV Titration 149.417 42.576 





  Amount   


 


    Diltiazem 125 mg In 61.75  





    Sodium Chloride 0.9% 100   





    ml @ 5 MG/HR 5 mls/hr IV   





    .Q24H CHASE Rx#:[id number]   


 


    Heparin Sod,Pork in 0.45% 87.667 42.576 





    NaCl 25,000 unit In 0.45   





    % NaCl 1 250ml.bag @ 11.   





    023 UNITS/KG/HR 10 mls/hr   





    IV .Q24H CHASE Rx#:   





    [id number]   


 


Output:   


 


  Urine  400 


 


Other:   


 


  # Voids  1 


 


  Weight 90.718 kg  














Results





                                 12/04/20 05:42





                                 12/04/20 05:42


                                 Cardiac Enzymes











  12/03/20 12/03/20 12/03/20 Range/Units





  09:18 09:18 12:08 


 


AST  40 H    (14-36)  U/L


 


Lactate Dehydrogenase  632 H    (313-618)  U/L


 


Troponin I   0.036 H*  0.040 H*  (0.000-0.034)  ng/mL














  12/03/20 12/04/20 Range/Units





  17:19 05:42 


 


AST   38 H  (14-36)  U/L


 


Lactate Dehydrogenase   616  (313-618)  U/L


 


Troponin I  0.047 H*   (0.000-0.034)  ng/mL








                                   Coagulation











  12/03/20 12/03/20 12/04/20 Range/Units





  09:18 17:19 01:20 


 


PT  11.1    (9.0-12.0)  sec


 


APTT  23.1  106.6 H*  86.0 H  (22.0-30.0)  sec














  12/04/20 Range/Units





  05:42 


 


PT   (9.0-12.0)  sec


 


APTT  63.1 H  (22.0-30.0)  sec








                                       CBC











  12/03/20 12/04/20 Range/Units





  09:18 05:42 


 


WBC  4.1  4.8  (3.8-10.6)  k/uL


 


RBC  4.35  3.80  (3.80-5.40)  m/uL


 


Hgb  13.0  11.8  (11.4-16.0)  gm/dL


 


Hct  39.7  35.3  (34.0-46.0)  %


 


Plt Count  182  153  (150-450)  k/uL








                          Comprehensive Metabolic Panel











  12/03/20 12/04/20 Range/Units





  09:18 05:42 


 


Sodium  142  140  (137-145)  mmol/L


 


Potassium  3.6  3.8  (3.5-5.1)  mmol/L


 


Chloride  112 H  115 H  ()  mmol/L


 


Carbon Dioxide  22  21 L  (22-30)  mmol/L


 


BUN  15  12  (7-17)  mg/dL


 


Creatinine  1.10 H  0.82  (0.52-1.04)  mg/dL


 


Glucose  115 H  83  (74-99)  mg/dL


 


Calcium  8.7  8.7  (8.4-10.2)  mg/dL


 


AST  40 H  38 H  (14-36)  U/L


 


ALT  32  29  (4-34)  U/L


 


Alkaline Phosphatase  63  62  ()  U/L


 


Total Protein  6.5  6.3  (6.3-8.2)  g/dL


 


Albumin  3.6  3.4 L  (3.5-5.0)  g/dL








                               Current Medications











Generic Name Dose Route Start Last Admin





  Trade Name Freq  PRN Reason Stop Dose Admin


 


Acetaminophen  650 mg  12/03/20 12:26 





  Acetaminophen Tab 325 Mg Tab  PO  





  Q6HR PRN  





  Mild Pain or Fever > 100.5  


 


Amlodipine Besylate  5 mg  12/04/20 09:00  12/04/20 07:54





  Amlodipine 5 Mg Tab  PO   5 mg





  DAILY CHASE   Administration


 


Atorvastatin Calcium  20 mg  12/04/20 09:00  12/04/20 07:54





  Atorvastatin 20 Mg Tab  PO   20 mg





  DAILY CHASE   Administration


 


Heparin Sodium (Porcine)  0 unit  12/03/20 10:49 





  Heparin Sodium,Porcine 5,000 Unit/Ml 1 Ml Vial  IV  





  PER PROTOCOL PRN  





  Low PTT  





  Protocol  


 


Sodium Chloride  1,000 mls @ 75 mls/hr  12/03/20 11:00  12/04/20 02:23





  Saline 0.9%  IV   Not Given





  .[id number] CHASE  


 


Heparin Sodium/Sodium Chloride  250 mls @ 10 mls/hr  12/03/20 11:00  12/04/20 

01:52





  25,000 unit/ Sodium Chloride  IV   6.023 units/kg/hr





  .Q24H CHASE   5.464 mls/hr





    Titration





  Protocol  





  11.023 UNITS/KG/HR  


 


Levothyroxine Sodium  75 mcg  12/04/20 06:30  12/04/20 06:27





  Levothyroxine 75 Mcg Tab  PO   75 mcg





  DAILY@0630 CHASE   Administration


 


Losartan Potassium  100 mg  12/04/20 09:00  12/04/20 07:54





  Losartan 50 Mg Tab  PO   100 mg





  DAILY CHASE   Administration


 


Naloxone HCl  0.2 mg  12/03/20 10:47 





  Naloxone 0.4 Mg/Ml 1 Ml Vial  IV  





  Q2M PRN  





  Opioid Reversal  








                                Intake and Output











 12/03/20 12/04/20 12/04/20





 22:59 06:59 14:59


 


Intake Total 149.417 42.576 


 


Output Total  400 


 


Balance 149.417 -357.424 


 


Intake:   


 


  Intake, IV Titration 149.417 42.576 





  Amount   


 


    Diltiazem 125 mg In 61.75  





    Sodium Chloride 0.9% 100   





    ml @ 5 MG/HR 5 mls/hr IV   





    .Q24H CHASE Rx#:[id number]   


 


    Heparin Sod,Pork in 0.45% 87.667 42.576 





    NaCl 25,000 unit In 0.45   





    % NaCl 1 250ml.bag @ 11.   





    023 UNITS/KG/HR 10 mls/hr   





    IV .Q24H CHASE Rx#:   





    [id number]   


 


Output:   


 


  Urine  400 


 


Other:   


 


  # Voids  1 


 


  Weight 90.718 kg  








                                        





                                 12/04/20 05:42 





                                 12/04/20 05:42

## 2020-12-04 NOTE — ECHOF
Referral Reason:a-fib new



MEASUREMENTS

--------

HEIGHT: 152.4 cm

WEIGHT: 90.7 kg

BP: 

RVIDd:   2.9 cm     (< 3.3)

IVSd:   1.1 cm     (0.6 - 1.1)

LVIDd:   5.1 cm     (3.9 - 5.3)

LVPWd:   1.1 cm     (0.6 - 1.1)

IVSs:   1.3 cm

LVIDs:   3.1 cm

LVPWs:   1.7 cm

LA Diam:   4.7 cm     (2.7 - 3.8)

LAESV Index (A-L):   32.94 ml/m

Ao Diam:   2.3 cm     (2.0 - 3.7)

AV Cusp:   1.4 cm     (1.5 - 2.6)

LA Diam:   4.5 cm     (2.7 - 3.8)

MV EXCURSION:   21.117 mm     (> 18.000)

MV EF SLOPE:   133 mm/s     (70 - 150)

EPSS:   1.4 cm

RAP:   5.00 mmHg

RVSP:   28.81 mmHg







FINDINGS

--------

This was a technically adequate study.

The left ventricular size is normal.   There is mild concentric left ventricular hypertrophy.   Overa
ll left ventricular systolic function is normal with, an EF between 55 - 60 %.

The right ventricle is normal in size.

LA is moderately dilated 34-39 ml/m2

The right atrial size is normal.

There is mild aortic valve sclerosis.   There is mild aortic regurgitation.

The mitral valve leaflets are mildly thickened.   Mild-to-moderate mitral regurgitation is present.

The tricuspid valve appears structurally normal.   Mild tricuspid regurgitation present.   Right vent
ricular systolic pressure is normal at < 35 mmHg.

There is no pulmonic regurgitation present.

The aortic root size is normal.

There is no pericardial effusion.



CONCLUSIONS

--------

1. There is mild concentric left ventricular hypertrophy.

2. LA is moderately dilated 34-39 ml/m2

3. There is mild aortic valve sclerosis.

4. There is mild aortic regurgitation.

5. Mild-to-moderate mitral regurgitation is present.

6. Mild tricuspid regurgitation present.





SONOGRAPHER: Melissa Logan RDCS

## 2020-12-05 LAB
BASOPHILS # BLD AUTO: 0 K/UL (ref 0–0.2)
BASOPHILS NFR BLD AUTO: 1 %
EOSINOPHIL # BLD AUTO: 0.1 K/UL (ref 0–0.7)
EOSINOPHIL NFR BLD AUTO: 3 %
ERYTHROCYTE [DISTWIDTH] IN BLOOD BY AUTOMATED COUNT: 4.02 M/UL (ref 3.8–5.4)
ERYTHROCYTE [DISTWIDTH] IN BLOOD: 13.1 % (ref 11.5–15.5)
HCT VFR BLD AUTO: 36.2 % (ref 34–46)
HGB BLD-MCNC: 12.1 GM/DL (ref 11.4–16)
LYMPHOCYTES # SPEC AUTO: 1.7 K/UL (ref 1–4.8)
LYMPHOCYTES NFR SPEC AUTO: 40 %
MCH RBC QN AUTO: 30 PG (ref 25–35)
MCHC RBC AUTO-ENTMCNC: 33.3 G/DL (ref 31–37)
MCV RBC AUTO: 90.2 FL (ref 80–100)
MONOCYTES # BLD AUTO: 0.2 K/UL (ref 0–1)
MONOCYTES NFR BLD AUTO: 6 %
NEUTROPHILS # BLD AUTO: 2.1 K/UL (ref 1.3–7.7)
NEUTROPHILS NFR BLD AUTO: 50 %
PLATELET # BLD AUTO: 163 K/UL (ref 150–450)
WBC # BLD AUTO: 4.3 K/UL (ref 3.8–10.6)

## 2020-12-05 RX ADMIN — ATORVASTATIN CALCIUM SCH MG: 20 TABLET, FILM COATED ORAL at 09:24

## 2020-12-05 RX ADMIN — APIXABAN SCH MG: 5 TABLET, FILM COATED ORAL at 20:43

## 2020-12-05 RX ADMIN — LEVOTHYROXINE SODIUM SCH MCG: 75 TABLET ORAL at 06:27

## 2020-12-05 RX ADMIN — APIXABAN SCH MG: 5 TABLET, FILM COATED ORAL at 09:24

## 2020-12-05 RX ADMIN — CEFAZOLIN SCH: 330 INJECTION, POWDER, FOR SOLUTION INTRAMUSCULAR; INTRAVENOUS at 05:34

## 2020-12-05 RX ADMIN — LOSARTAN POTASSIUM SCH MG: 50 TABLET, FILM COATED ORAL at 09:24

## 2020-12-05 RX ADMIN — CEFAZOLIN SCH: 330 INJECTION, POWDER, FOR SOLUTION INTRAMUSCULAR; INTRAVENOUS at 16:20

## 2020-12-05 NOTE — PN
PROGRESS NOTE



Judah is a 74-year-old lady who is admitted to hospital with Covid 19 and atrial

fibrillation with rapid ventricular rate.  She is currently in sinus rhythm on Eliquis

5 b.i.d., Norvasc, Cozaar 100 mg daily.  Her blood pressure was quite elevated last

night but this morning the blood pressure has been normal. In fact low.  Because of

coronavirus infection, I evaluated the patient over the phone.



EXAM:

She is afebrile.  Heart rate is 60 beats per minute.  Blood pressure is 97/50, O2

saturation is 96% on room air.  Labs show a hemoglobin of 12.1, creatinine is 0.8.



ASSESSMENT:

1. Paroxysmal atrial fibrillation.

2. History of hypertension.



PLAN:

I am going to hold the HydroDIURIL that she is on.  Continue the amlodipine.  Continue

the Cozaar, Eliquis and Lipitor.  Hopefully home tomorrow.





MMODL / IJN: [id number] / Job#: [id number]

## 2020-12-05 NOTE — P.PN
Subjective


Progress Note Date: 12/05/20


Principal diagnosis: 





sob





Patient is doing better today, she denied having any shortness of breath or 

chest pain.  Her blood pressure was very high last night up to 222 systolic.  

She was given 2 doses of hydralazine.  Today her blood pressure has been normal,

actually on the low side.





Objective





- Vital Signs


Vital signs: 


                                   Vital Signs











Temp  97.5 F L  12/05/20 11:57


 


Pulse  60   12/05/20 11:57


 


Resp  18   12/05/20 11:57


 


BP  97/50   12/05/20 11:57


 


Pulse Ox  96   12/05/20 11:57








                                 Intake & Output











 12/04/20 12/05/20 12/05/20





 18:59 06:59 18:59


 


Intake Total 360  240


 


Balance 360  240


 


Weight  91.5 kg 


 


Intake:   


 


  Oral 360  240


 


Other:   


 


  Voiding Method  Toilet Toilet


 


  # Voids 2 1 1














- Exam











Constitutional: Appears ill, conversant, pleasant


Eyes:Anicteric sclerae, moist conjunctiva, no lid-lag, PERRLA, 


ENMT: Oropharynx clear, no erythema, exudates


Neck: Supple, FROM, no masses, or JVD, No carotid bruits, No thyromegaly


Lungs: Clear to auscultation, Clear to percussion, Normal respiratory effort, no

accessory muscle use 


Cardiovascular: RRR, No murmurs, gallops, or rubs, No peripheral edema


Abdominal: Soft, Nontender, no guarding, rebound or rigidity, Normoactive bowel 

sounds, No hepatomegaly, No splenomegaly, No palpable mass 


Skin: Normal temperature, tone, texture, turgor, no induration, No subcutaneous 

nodules, No rash, lesions, No ulcers


Extremities: No digital cyanosis, No clubbing, Pedal pulses intact and 

symmetrical, Radial pulses intact and symmetrical, No calf tenderness 


Psychiatric: Alert and oriented to person, place and time, appropriate affect, 

intact judgement         


Neuro: Muscles Strength 5/5 in all 4 extremities, Sensation to light touch 

grossly present throughout, Cranial nerves II-XII grossly intact, no focal sen

john deficits











- Labs


CBC & Chem 7: 


                                 12/05/20 07:55





                                 12/04/20 05:42


Labs: 


                      Microbiology - Last 24 Hours (Table)











 12/03/20 09:18 Blood Culture - Preliminary





 Blood    No Growth after 48 hours














Assessment and Plan


Plan: 





Atrial fibrillation with rapid ventricular response currently in sinus rhythm


Could be related to covid 19


TSH ok


Off cardizem drip, HR currently in the 50s-60s


Heparin drip is discontinued, patient started on eliquis per cardiology


Echo showed mild concentric LVH, left atrial dilation as well as mild to 

moderate mitral regurgitation.





Elevated troponins


Possibly related to covid 19


Echo negative for wall motion abnormalities





Hypertensive urgency


Continue losartan


Amlodipine added





Covid 19 infection


No evidence of pneumonia on CXR and patient not requiring oxygen


No treatment for COVID indicated at this point.





Hyperlipidemia


Hypothyroidism


All stable


Resume meds





General weakness


PT evaluation





Anticipated discharge: in am

## 2020-12-06 VITALS — SYSTOLIC BLOOD PRESSURE: 121 MMHG | DIASTOLIC BLOOD PRESSURE: 59 MMHG

## 2020-12-06 VITALS — TEMPERATURE: 97.9 F | HEART RATE: 60 BPM | RESPIRATION RATE: 18 BRPM

## 2020-12-06 LAB
BASOPHILS # BLD AUTO: 0 K/UL (ref 0–0.2)
BASOPHILS NFR BLD AUTO: 0 %
EOSINOPHIL # BLD AUTO: 0.1 K/UL (ref 0–0.7)
EOSINOPHIL NFR BLD AUTO: 2 %
ERYTHROCYTE [DISTWIDTH] IN BLOOD BY AUTOMATED COUNT: 4.42 M/UL (ref 3.8–5.4)
ERYTHROCYTE [DISTWIDTH] IN BLOOD: 13.3 % (ref 11.5–15.5)
HCT VFR BLD AUTO: 41 % (ref 34–46)
HGB BLD-MCNC: 12.9 GM/DL (ref 11.4–16)
LYMPHOCYTES # SPEC AUTO: 1.9 K/UL (ref 1–4.8)
LYMPHOCYTES NFR SPEC AUTO: 38 %
MCH RBC QN AUTO: 29.2 PG (ref 25–35)
MCHC RBC AUTO-ENTMCNC: 31.5 G/DL (ref 31–37)
MCV RBC AUTO: 92.8 FL (ref 80–100)
MONOCYTES # BLD AUTO: 0.3 K/UL (ref 0–1)
MONOCYTES NFR BLD AUTO: 5 %
NEUTROPHILS # BLD AUTO: 2.6 K/UL (ref 1.3–7.7)
NEUTROPHILS NFR BLD AUTO: 53 %
PLATELET # BLD AUTO: 217 K/UL (ref 150–450)
WBC # BLD AUTO: 4.8 K/UL (ref 3.8–10.6)

## 2020-12-06 RX ADMIN — APIXABAN SCH MG: 5 TABLET, FILM COATED ORAL at 08:34

## 2020-12-06 RX ADMIN — LOSARTAN POTASSIUM SCH MG: 50 TABLET, FILM COATED ORAL at 08:34

## 2020-12-06 RX ADMIN — CEFAZOLIN SCH: 330 INJECTION, POWDER, FOR SOLUTION INTRAMUSCULAR; INTRAVENOUS at 06:26

## 2020-12-06 RX ADMIN — LEVOTHYROXINE SODIUM SCH MCG: 75 TABLET ORAL at 06:26

## 2020-12-06 RX ADMIN — ATORVASTATIN CALCIUM SCH MG: 20 TABLET, FILM COATED ORAL at 08:34

## 2020-12-06 NOTE — P.DS
Providers


Date of admission: 


12/03/20 11:01





Expected date of discharge: 12/06/20


Attending physician: 


Burak Laguna





Consults: 





                                        





12/03/20 10:48


Consult Physician Routine 


   Consulting Provider: [facility]


   Consult Reason/Comments: new onset a fib


   Do you want consulting provider notified?: Yes











Primary care physician: 


Akil Amaral MD





Hospital Course: 








74-year-old female with a past medical history of hyperlipidemia, hypertension 

presented to the emergency room for a chief complaint of shortness of breath.  

Patient was diagnosed with Covid 9 days prior to admission. Patient stated she 

has had symptoms of weakness and a slight cough productive of brownish phlegm 

but no other real symptoms.  She stated she might have had a slight fever.  No 

chills.  Denied chest pain.  No history of falls. No abdominal pain, nausea or 

vomiting, headache, or visual changes.





Evaluation in the emergency department revealed elevated heart rate in the 110s,

blood pressure is stable.  Patient was not tachypneic.  Labs showed no 

leukocytosis.  Creatinine 1.1, AST 40, ALT normal.  , troponin 0.036.  

Chest x-ray did not show any acute cardiopulmonary disease.  EKG showed right 

bundle-branch block, atrial fibrillation with rapid ventricular response.





Patient was admitted to the hospital, she was started on Cardizem drip for the 

RPR and after a brief period on the Cardizem her rhythm converted back to normal

sinus with a heart rate ranging between 50-60.  TSH was tested and that came 

back okay.  Troponin was retested and cycled and it was flat.  No ACS was 

suspected. Patient had an echocardiogram that showed mild concentric LVH, left 

atrial dilation as well as mild to moderate mitral regurgitation.  Patient was 

initially treated with IV heparin and that was changed to eliquis by cardiology.





It was noted that her blood pressure was slightly high throughout the admission,

she was started on Norvasc but since her blood pressure normalized towards the 

end of the admission but was discontinued and she was resumed on her home 

original blood pressure medications.  She was told to follow-up with her primary

care physician to check on her blood pressure.





Patient is currently feeling much better, no shortness of breath or dizziness, 

she is ambulating okay.  She'll be discharged home in a stable condition.





Discharge diagnoses


New-onset atrial fibrillation


Covid 19 pneumonia


Shortness of breath secondary to above


Hypertensive urgency





Time for discharge 35 minutes.





Plan - Discharge Summary


Discharge Rx Participant: No


New Discharge Prescriptions: 


New


   Apixaban [Eliquis] 5 mg PO BID 30 Days #60 tab





Continue


   Levothyroxine Sodium [Synthroid] 75 mcg PO DAILY


   Losartan Potassium 100 mg PO DAILY


   Simvastatin [Zocor] 20 mg PO DAILY


Discharge Medication List





Levothyroxine Sodium [Synthroid] 75 mcg PO DAILY 12/23/15 [History]


Losartan Potassium 100 mg PO DAILY 12/23/15 [History]


Simvastatin [Zocor] 20 mg PO DAILY 09/25/18 [History]


Apixaban [Eliquis] 5 mg PO BID 30 Days #60 tab 12/06/20 [Rx]








Follow up Appointment(s)/Referral(s): 


Akil Amaral MD [Primary Care Provider] - 1-2 days (Please call office to 

schedule follow up appointment - office not open at this time. )


Vazquez Segura MD [STAFF PHYSICIAN] - 1 Week (Please call office to schedule 

follow up - office not open at this time. )


Patient Instructions/Handouts:  A-fib (Atrial Fibrillation) (DC)


Discharge Disposition: HOME SELF-CARE

## 2020-12-06 NOTE — P.PN
Subjective


Progress Note Date: 12/06/20


This is a 74-year-old lady who was admitted to the hospital with cold bed and 19

and atrial fibrillation with rapid ventricular response.  She is maintaining 

sinus rhythm and anticoagulated.  She is currently on Norvasc and her Cozaar.  

Her blood pressure was quite elevated but yesterday was found to be somewhat 

low.  Her hydrochlorothiazide has been on hold.  Overall she is feeling well.  

Vital signs are stable.  Heart rates in the 50s and 60s.  She is afebrile.  

Blood pressure this morning is 127/58.








Objective





- Vital Signs


Vital signs: 


                                   Vital Signs











Temp  97.9 F   12/06/20 08:00


 


Pulse  60   12/06/20 08:00


 


Resp  18   12/06/20 08:00


 


BP  127/58   12/06/20 08:00


 


Pulse Ox  94 L  12/06/20 08:00








                                 Intake & Output











 12/05/20 12/06/20 12/06/20





 18:59 06:59 18:59


 


Intake Total 1185 200 118


 


Balance 1185 200 118


 


Weight  79.5 kg 


 


Intake:   


 


  Oral 1185 200 118


 


Other:   


 


  Voiding Method Toilet Toilet 


 


  # Voids 3  














- Exam


Thorough exam not completed secondary to limited evaluation/examination due to 

COVID 19. Pt was interviewed and information obtained from medical record and 

nursing staff.











- Labs


CBC & Chem 7: 


                                 12/06/20 08:35





                                 12/04/20 05:42


Labs: 


                      Microbiology - Last 24 Hours (Table)











 12/03/20 09:18 Blood Culture - Preliminary





 Blood    No Growth after 48 hours














Assessment and Plan


Assessment: 


#1 paroxysmal atrial fibrillation


#2 COVID-19


#3 hypertension





Plan: 


From cardiology perspective medications were reviewed and we will continue the 

same.  From our standpoint patient to be discharged home.  She'll follow-up in 

the office in 2 weeks.





The above dictated assessment and findings were discussed with signing 

physician. The impression and plan of care have been directed as dictated. 

Jazmine Watson, Nurse Practitioner, acting as scribe for signing physician.

## 2021-03-08 ENCOUNTER — HOSPITAL ENCOUNTER (INPATIENT)
Dept: HOSPITAL 47 - EC | Age: 76
LOS: 2 days | Discharge: HOME | DRG: 690 | End: 2021-03-10
Attending: INTERNAL MEDICINE | Admitting: INTERNAL MEDICINE
Payer: MEDICARE

## 2021-03-08 VITALS — BODY MASS INDEX: 33.6 KG/M2

## 2021-03-08 DIAGNOSIS — Z87.19: ICD-10-CM

## 2021-03-08 DIAGNOSIS — R39.2: ICD-10-CM

## 2021-03-08 DIAGNOSIS — E03.9: ICD-10-CM

## 2021-03-08 DIAGNOSIS — N30.01: Primary | ICD-10-CM

## 2021-03-08 DIAGNOSIS — Z88.6: ICD-10-CM

## 2021-03-08 DIAGNOSIS — Z90.49: ICD-10-CM

## 2021-03-08 DIAGNOSIS — K57.30: ICD-10-CM

## 2021-03-08 DIAGNOSIS — Z79.01: ICD-10-CM

## 2021-03-08 DIAGNOSIS — I10: ICD-10-CM

## 2021-03-08 DIAGNOSIS — Z90.710: ICD-10-CM

## 2021-03-08 DIAGNOSIS — G71.09: ICD-10-CM

## 2021-03-08 DIAGNOSIS — Z87.42: ICD-10-CM

## 2021-03-08 DIAGNOSIS — Z20.822: ICD-10-CM

## 2021-03-08 DIAGNOSIS — I48.0: ICD-10-CM

## 2021-03-08 DIAGNOSIS — E86.0: ICD-10-CM

## 2021-03-08 DIAGNOSIS — Z91.030: ICD-10-CM

## 2021-03-08 DIAGNOSIS — Z98.890: ICD-10-CM

## 2021-03-08 DIAGNOSIS — Z79.899: ICD-10-CM

## 2021-03-08 DIAGNOSIS — Z79.890: ICD-10-CM

## 2021-03-08 DIAGNOSIS — E78.5: ICD-10-CM

## 2021-03-08 LAB
ALBUMIN SERPL-MCNC: 3.7 G/DL (ref 3.5–5)
ALP SERPL-CCNC: 57 U/L (ref 38–126)
ALT SERPL-CCNC: 21 U/L (ref 4–34)
ANION GAP SERPL CALC-SCNC: 8 MMOL/L
APTT BLD: 22.4 SEC (ref 22–30)
AST SERPL-CCNC: 27 U/L (ref 14–36)
BASOPHILS # BLD AUTO: 0 K/UL (ref 0–0.2)
BASOPHILS NFR BLD AUTO: 0 %
BUN SERPL-SCNC: 28 MG/DL (ref 7–17)
CALCIUM SPEC-MCNC: 9.2 MG/DL (ref 8.4–10.2)
CHLORIDE SERPL-SCNC: 108 MMOL/L (ref 98–107)
CO2 SERPL-SCNC: 25 MMOL/L (ref 22–30)
EOSINOPHIL # BLD AUTO: 0.2 K/UL (ref 0–0.7)
EOSINOPHIL NFR BLD AUTO: 4 %
ERYTHROCYTE [DISTWIDTH] IN BLOOD BY AUTOMATED COUNT: 3.53 M/UL (ref 3.8–5.4)
ERYTHROCYTE [DISTWIDTH] IN BLOOD BY AUTOMATED COUNT: 3.79 M/UL (ref 3.8–5.4)
ERYTHROCYTE [DISTWIDTH] IN BLOOD: 13.2 % (ref 11.5–15.5)
ERYTHROCYTE [DISTWIDTH] IN BLOOD: 13.3 % (ref 11.5–15.5)
GLUCOSE SERPL-MCNC: 97 MG/DL (ref 74–99)
HCT VFR BLD AUTO: 33.2 % (ref 34–46)
HCT VFR BLD AUTO: 35.8 % (ref 34–46)
HGB BLD-MCNC: 11.3 GM/DL (ref 11.4–16)
HGB BLD-MCNC: 11.8 GM/DL (ref 11.4–16)
INR PPP: 1.1 (ref ?–1.2)
LYMPHOCYTES # SPEC AUTO: 1.5 K/UL (ref 1–4.8)
LYMPHOCYTES NFR SPEC AUTO: 31 %
MCH RBC QN AUTO: 31.1 PG (ref 25–35)
MCH RBC QN AUTO: 31.9 PG (ref 25–35)
MCHC RBC AUTO-ENTMCNC: 32.9 G/DL (ref 31–37)
MCHC RBC AUTO-ENTMCNC: 33.9 G/DL (ref 31–37)
MCV RBC AUTO: 94.1 FL (ref 80–100)
MCV RBC AUTO: 94.3 FL (ref 80–100)
MONOCYTES # BLD AUTO: 0.4 K/UL (ref 0–1)
MONOCYTES NFR BLD AUTO: 8 %
NEUTROPHILS # BLD AUTO: 2.5 K/UL (ref 1.3–7.7)
NEUTROPHILS NFR BLD AUTO: 53 %
PH UR: 5 [PH] (ref 5–8)
PLATELET # BLD AUTO: 151 K/UL (ref 150–450)
PLATELET # BLD AUTO: 158 K/UL (ref 150–450)
POTASSIUM SERPL-SCNC: 4.1 MMOL/L (ref 3.5–5.1)
PROT SERPL-MCNC: 6.6 G/DL (ref 6.3–8.2)
PT BLD: 11.3 SEC (ref 9–12)
RBC UR QL: >182 /HPF (ref 0–5)
SODIUM SERPL-SCNC: 141 MMOL/L (ref 137–145)
SP GR UR: 1.02 (ref 1–1.03)
SQUAMOUS UR QL AUTO: 2 /HPF (ref 0–4)
UROBILINOGEN UR QL STRIP: <2 MG/DL (ref ?–2)
WBC # BLD AUTO: 4.5 K/UL (ref 3.8–10.6)
WBC # BLD AUTO: 4.8 K/UL (ref 3.8–10.6)
WBC # UR AUTO: 125 /HPF (ref 0–5)

## 2021-03-08 PROCEDURE — 85025 COMPLETE CBC W/AUTO DIFF WBC: CPT

## 2021-03-08 PROCEDURE — 85027 COMPLETE CBC AUTOMATED: CPT

## 2021-03-08 PROCEDURE — 74176 CT ABD & PELVIS W/O CONTRAST: CPT

## 2021-03-08 PROCEDURE — 87635 SARS-COV-2 COVID-19 AMP PRB: CPT

## 2021-03-08 PROCEDURE — 85730 THROMBOPLASTIN TIME PARTIAL: CPT

## 2021-03-08 PROCEDURE — 86901 BLOOD TYPING SEROLOGIC RH(D): CPT

## 2021-03-08 PROCEDURE — 87070 CULTURE OTHR SPECIMN AEROBIC: CPT

## 2021-03-08 PROCEDURE — 86900 BLOOD TYPING SEROLOGIC ABO: CPT

## 2021-03-08 PROCEDURE — 80048 BASIC METABOLIC PNL TOTAL CA: CPT

## 2021-03-08 PROCEDURE — 81001 URINALYSIS AUTO W/SCOPE: CPT

## 2021-03-08 PROCEDURE — 96374 THER/PROPH/DIAG INJ IV PUSH: CPT

## 2021-03-08 PROCEDURE — 87086 URINE CULTURE/COLONY COUNT: CPT

## 2021-03-08 PROCEDURE — 83735 ASSAY OF MAGNESIUM: CPT

## 2021-03-08 PROCEDURE — 86850 RBC ANTIBODY SCREEN: CPT

## 2021-03-08 PROCEDURE — 36415 COLL VENOUS BLD VENIPUNCTURE: CPT

## 2021-03-08 PROCEDURE — 99285 EMERGENCY DEPT VISIT HI MDM: CPT

## 2021-03-08 PROCEDURE — 80053 COMPREHEN METABOLIC PANEL: CPT

## 2021-03-08 PROCEDURE — 85610 PROTHROMBIN TIME: CPT

## 2021-03-08 PROCEDURE — 82272 OCCULT BLD FECES 1-3 TESTS: CPT

## 2021-03-08 RX ADMIN — PANTOPRAZOLE SODIUM SCH MG: 40 INJECTION, POWDER, FOR SOLUTION INTRAVENOUS at 17:59

## 2021-03-08 RX ADMIN — CEFAZOLIN SCH MLS/HR: 330 INJECTION, POWDER, FOR SOLUTION INTRAMUSCULAR; INTRAVENOUS at 17:57

## 2021-03-08 NOTE — CT
EXAMINATION TYPE: CT abdomen pelvis wo con

 

DATE OF EXAM: 3/8/2021

 

COMPARISON: None.

 

HISTORY: Left lower quadrant pain with bleeding.

 

CT DLP: 711.2 mGycm

Automated exposure control for dose reduction was used.

 

TECHNIQUE:  Helical acquisition of images was performed from the lung bases through the pelvis.

 

FINDINGS: 

 

LUNG BASES: No significant abnormality is appreciated.

 

LIVER/GB: No significant abnormality is appreciated.

 

PANCREAS: No significant abnormality is seen.

 

SPLEEN: No significant abnormality is seen.

 

ADRENALS: No significant abnormality is seen.

 

KIDNEYS: No significant abnormality is seen.

 

 

FREE AIR:  No free air is visualized

 

RETROPERITONEAL ADENOPATHY:  None visualized

 

REPRODUCTIVE ORGANS: No significant abnormality is seen

 

URINARY BLADDER:  No significant abnormality is seen.

 

PELVIC ADENOPATHY:  None visualized.

 

OSSEOUS STRUCTURES:  No significant abnormality is seen.

 

BOWEL:  There is demonstration of severe sigmoid colon diverticulosis there is mild fat stranding shayna
und the sigmoid colon. Findings could represent mild case of diverticulitis. There is no free air in 
no abscess collections are seen. No free fluid. There is no bowel obstruction.

 

OTHER: There is no abdominal aortic aneurysm. Cholelithiasis is noted.

 

Numerous phleboliths are seen in the pelvis.

 

Moderate degenerative changes are seen at the symphysis pubis.

 

IMPRESSION: 

SEVERE SIGMOID COLONIC DIVERTICULOSIS. TRACE FAT STRANDING SURROUNDING THE SIGMOID COLON RAISING SUSP
ICION FOR MILD CASE OF DIVERTICULITIS. NO FREE AIR. NO ABSCESS COLLECTIONS. NO BOWEL OBSTRUCTION.

 

CHOLELITHIASIS WITHOUT ACUTE CHOLECYSTITIS.

 

NUMEROUS PHLEBOLITHS IN THE PELVIS OBSCURING VISUALIZATION OF URETERS.

## 2021-03-08 NOTE — ED
Female Urogenital HPI





- General


Chief complaint: Vaginal Bleeding


Stated complaint: Female 


Time Seen by Provider: 03/08/21 13:29


Source: patient


Mode of arrival: wheelchair


Limitations: no limitations





- History of Present Illness


Initial comments: 





Patient is a 75-year-old female presenting to the emergency department with 

concerns of vaginal bleeding that has been on and off for the past 2 weeks.  She

states that she did go to a gynecologist who did an exam and found no vaginal 

bleeding at that time.  Patient states the bleeding has been intermittent, today

it seems to be worse and she decided to be seen at the ER.  She does have 

history of complete hysterectomy by Dr. Owens.  She denies any abdominal pain, 

no nausea or vomiting, no fever or chills.  She states she is not on a blood 

thinner, however she was recently started on eliquis for A-fib.  She does not 

believe it is coming from her rectum or when she urinates.   She has no further 

complaints at this time.  Upon arrival to the ER, her vitals are stable.





- Related Data


                                Home Medications











 Medication  Instructions  Recorded  Confirmed


 


Levothyroxine Sodium [Synthroid] 75 mcg PO DAILY 12/23/15 03/08/21


 


Losartan Potassium 100 mg PO DAILY 12/23/15 03/08/21


 


Simvastatin [Zocor] 20 mg PO DAILY 09/25/18 03/08/21


 


amLODIPine [Norvasc] 2.5 mg PO DAILY 03/08/21 03/08/21








                                  Previous Rx's











 Medication  Instructions  Recorded


 


Apixaban [Eliquis] 5 mg PO BID 30 Days #60 tab 12/06/20











                                    Allergies











Allergy/AdvReac Type Severity Reaction Status Date / Time


 


venom-honey bee Allergy  Anaphylaxis Verified 03/08/21 14:03





[bee venom (honey bee)]     


 


aspirin AdvReac  severe Verified 03/08/21 14:03





   stomach  





   pain  














Review of Systems


ROS Statement: 


Those systems with pertinent positive or pertinent negative responses have been 

documented in the HPI.





ROS Other: All systems not noted in ROS Statement are negative.





Past Medical History


Past Medical History: Hyperlipidemia, Hypertension


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


History of Any Multi-Drug Resistant Organisms: None Reported


Past Surgical History: Appendectomy, Hysterectomy, Orthopedic Surgery


Past Anesthesia/Blood Transfusion Reactions: No Reported Reaction


Past Psychological History: No Psychological Hx Reported


Smoking Status: Never smoker


Past Alcohol Use History: None Reported


Past Drug Use History: None Reported





- Past Family History


  ** Father


Additional Family Medical History / Comment(s): lung





  ** Mother


Additional Family Medical History / Comment(s): pancreas,liver





General Exam





- General Exam Comments


Initial Comments: 





GENERAL: 


Patient is well-developed and well-nourished.  Patient is nontoxic and in no 

acute distress.





HEAD: 


Atraumatic, normocephalic.





EYES:


Pupils equal round and reactive to light, extraocular movements intact, sclera 

anicteric, conjunctiva are normal.  Eyelids were unremarkable.





ENT: 


TMs normal, nares patent, oropharynx clear without exudates.  Moist mucous me

mbranes.





NECK: 


Normal range of motion, supple without lymphadenopathy or JVD.





LUNGS:


Unlabored respirations.  Breath sounds clear to auscultation bilaterally and 

equal.  No wheezes rales or rhonchi.





HEART:


Regular rate and rhythm without murmurs, rubs or gallops.





ABDOMEN: 


Soft, nontender, normoactive bowel sounds.  No guarding, no rebound.  No masses 

appreciated.





MUSCULOSKELETAL: 


Normal extremities with adequate strength and normal range of motion, no pitting

or edema.  No clubbing or cyanosis.





NEUROLOGICAL: 


Patient is alert and oriented x 3.  Motor and sensory are also intact.  Cranial 

nerves II through XII grossly intact.  Symmetrical smile.  Normal speech, normal

gait.   





PSYCH:


Normal mood, normal affect.





SKIN:


 Warm, Dry, normal turgor, no rashes or lesions noted.


Limitations: no limitations


Rectal exam: Present: normal inspection, normal rectal tone, heme (+) stool


External exam: Present: normal external exam


Speculum exam: Absent: cervical discharge, vaginal bleeding, foreign body


By manual exam: Present: normal by manual exam





Course


                                   Vital Signs











  03/08/21 03/08/21 03/08/21





  12:21 14:25 15:00


 


Temperature 97.6 F  


 


Pulse Rate 60  


 


Respiratory 18 16 18





Rate   


 


Blood Pressure 128/55  


 


O2 Sat by Pulse 99  





Oximetry   














  03/08/21





  16:00


 


Temperature 


 


Pulse Rate 57 L


 


Respiratory 18





Rate 


 


Blood Pressure 146/51


 


O2 Sat by Pulse 98





Oximetry 














Medical Decision Making





- Medical Decision Making





Patient is a 75-year-old female here with concerns of vaginal bleeding that's 

been intermittent over the past few days.  No abdominal pain, no fevers, no 

dysuria.  She has history of complete hysterectomy by Dr. Owens.  Normal white 

count, hemoglobin is stable at 11.8, urine shows large amount blood, rare 

bacteria, stool occult is positive.  She has no vaginal bleeding on exam.  Pt is

on eliquis secondary to A. fib.  Patient is very persistent that she has no 

rectal bleeding and that its all vaginal.  Patient will be admitted for GI 

bleed.  Patient accepted by Dr. Allen, GI on consult.  Case discussed with Dr. Joyner. 





- Lab Data


Result diagrams: 


                                 03/08/21 14:28





                                 03/08/21 15:45


                                   Lab Results











  03/08/21 03/08/21 03/08/21 Range/Units





  14:28 14:28 14:28 


 


WBC  4.8    (3.8-10.6)  k/uL


 


RBC  3.79 L    (3.80-5.40)  m/uL


 


Hgb  11.8    (11.4-16.0)  gm/dL


 


Hct  35.8    (34.0-46.0)  %


 


MCV  94.3    (80.0-100.0)  fL


 


MCH  31.1    (25.0-35.0)  pg


 


MCHC  32.9    (31.0-37.0)  g/dL


 


RDW  13.3    (11.5-15.5)  %


 


Plt Count  158    (150-450)  k/uL


 


MPV  10.1    


 


Neutrophils %  53    %


 


Lymphocytes %  31    %


 


Monocytes %  8    %


 


Eosinophils %  4    %


 


Basophils %  0    %


 


Neutrophils #  2.5    (1.3-7.7)  k/uL


 


Lymphocytes #  1.5    (1.0-4.8)  k/uL


 


Monocytes #  0.4    (0-1.0)  k/uL


 


Eosinophils #  0.2    (0-0.7)  k/uL


 


Basophils #  0.0    (0-0.2)  k/uL


 


PT    11.3  (9.0-12.0)  sec


 


INR    1.1  (<1.2)  


 


APTT    22.4  (22.0-30.0)  sec


 


Sodium     (137-145)  mmol/L


 


Potassium     (3.5-5.1)  mmol/L


 


Chloride     ()  mmol/L


 


Carbon Dioxide     (22-30)  mmol/L


 


Anion Gap     mmol/L


 


BUN     (7-17)  mg/dL


 


Creatinine     (0.52-1.04)  mg/dL


 


Est GFR (CKD-EPI)AfAm     (>60 ml/min/1.73 sqM)  


 


Est GFR (CKD-EPI)NonAf     (>60 ml/min/1.73 sqM)  


 


Glucose     (74-99)  mg/dL


 


Calcium     (8.4-10.2)  mg/dL


 


Total Bilirubin     (0.2-1.3)  mg/dL


 


AST     (14-36)  U/L


 


ALT     (4-34)  U/L


 


Alkaline Phosphatase     ()  U/L


 


Total Protein     (6.3-8.2)  g/dL


 


Albumin     (3.5-5.0)  g/dL


 


Urine Color     


 


Urine Appearance     (Clear)  


 


Urine pH     (5.0-8.0)  


 


Ur Specific Gravity     (1.001-1.035)  


 


Urine Protein     (Negative)  


 


Urine Glucose (UA)     (Negative)  


 


Urine Ketones     (Negative)  


 


Urine Blood     (Negative)  


 


Urine Nitrite     (Negative)  


 


Urine Bilirubin     (Negative)  


 


Urine Urobilinogen     (<2.0)  mg/dL


 


Ur Leukocyte Esterase     (Negative)  


 


Urine RBC     (0-5)  /hpf


 


Urine WBC     (0-5)  /hpf


 


Ur Squamous Epith Cells     (0-4)  /hpf


 


Urine Bacteria     (None)  /hpf


 


Urine Mucus     (None)  /hpf


 


Stool Occult Blood   Positive H   (Negative)  


 


Blood Type     


 


Blood Type Confirm     


 


Blood Type Recheck     


 


Bld Type Recheck Status     


 


Antibody Screen     


 


Spec Expiration Date     














  03/08/21 03/08/21 03/08/21 Range/Units





  14:28 15:40 15:45 


 


WBC     (3.8-10.6)  k/uL


 


RBC     (3.80-5.40)  m/uL


 


Hgb     (11.4-16.0)  gm/dL


 


Hct     (34.0-46.0)  %


 


MCV     (80.0-100.0)  fL


 


MCH     (25.0-35.0)  pg


 


MCHC     (31.0-37.0)  g/dL


 


RDW     (11.5-15.5)  %


 


Plt Count     (150-450)  k/uL


 


MPV     


 


Neutrophils %     %


 


Lymphocytes %     %


 


Monocytes %     %


 


Eosinophils %     %


 


Basophils %     %


 


Neutrophils #     (1.3-7.7)  k/uL


 


Lymphocytes #     (1.0-4.8)  k/uL


 


Monocytes #     (0-1.0)  k/uL


 


Eosinophils #     (0-0.7)  k/uL


 


Basophils #     (0-0.2)  k/uL


 


PT     (9.0-12.0)  sec


 


INR     (<1.2)  


 


APTT     (22.0-30.0)  sec


 


Sodium    141  (137-145)  mmol/L


 


Potassium    4.1  (3.5-5.1)  mmol/L


 


Chloride    108 H  ()  mmol/L


 


Carbon Dioxide    25  (22-30)  mmol/L


 


Anion Gap    8  mmol/L


 


BUN    28 H  (7-17)  mg/dL


 


Creatinine    0.93  (0.52-1.04)  mg/dL


 


Est GFR (CKD-EPI)AfAm    70  (>60 ml/min/1.73 sqM)  


 


Est GFR (CKD-EPI)NonAf    60  (>60 ml/min/1.73 sqM)  


 


Glucose    97  (74-99)  mg/dL


 


Calcium    9.2  (8.4-10.2)  mg/dL


 


Total Bilirubin    0.7  (0.2-1.3)  mg/dL


 


AST    27  (14-36)  U/L


 


ALT    21  (4-34)  U/L


 


Alkaline Phosphatase    57  ()  U/L


 


Total Protein    6.6  (6.3-8.2)  g/dL


 


Albumin    3.7  (3.5-5.0)  g/dL


 


Urine Color  Light Red    


 


Urine Appearance  Clear    (Clear)  


 


Urine pH  5.0    (5.0-8.0)  


 


Ur Specific Gravity  1.022    (1.001-1.035)  


 


Urine Protein  Trace H    (Negative)  


 


Urine Glucose (UA)  Negative    (Negative)  


 


Urine Ketones  Negative    (Negative)  


 


Urine Blood  Large H    (Negative)  


 


Urine Nitrite  Negative    (Negative)  


 


Urine Bilirubin  Negative    (Negative)  


 


Urine Urobilinogen  <2.0    (<2.0)  mg/dL


 


Ur Leukocyte Esterase  Large H    (Negative)  


 


Urine RBC  >182 H    (0-5)  /hpf


 


Urine WBC  125 H    (0-5)  /hpf


 


Ur Squamous Epith Cells  2    (0-4)  /hpf


 


Urine Bacteria  Rare H    (None)  /hpf


 


Urine Mucus  Occasional H    (None)  /hpf


 


Stool Occult Blood     (Negative)  


 


Blood Type   A Positive   


 


Blood Type Confirm     


 


Blood Type Recheck   No Previous Record   


 


Bld Type Recheck Status   CABO Indicated   


 


Antibody Screen   NEGATIVE   


 


Spec Expiration Date   03/11/2021 03/08/21 Range/Units





  15:45 


 


WBC   (3.8-10.6)  k/uL


 


RBC   (3.80-5.40)  m/uL


 


Hgb   (11.4-16.0)  gm/dL


 


Hct   (34.0-46.0)  %


 


MCV   (80.0-100.0)  fL


 


MCH   (25.0-35.0)  pg


 


MCHC   (31.0-37.0)  g/dL


 


RDW   (11.5-15.5)  %


 


Plt Count   (150-450)  k/uL


 


MPV   


 


Neutrophils %   %


 


Lymphocytes %   %


 


Monocytes %   %


 


Eosinophils %   %


 


Basophils %   %


 


Neutrophils #   (1.3-7.7)  k/uL


 


Lymphocytes #   (1.0-4.8)  k/uL


 


Monocytes #   (0-1.0)  k/uL


 


Eosinophils #   (0-0.7)  k/uL


 


Basophils #   (0-0.2)  k/uL


 


PT   (9.0-12.0)  sec


 


INR   (<1.2)  


 


APTT   (22.0-30.0)  sec


 


Sodium   (137-145)  mmol/L


 


Potassium   (3.5-5.1)  mmol/L


 


Chloride   ()  mmol/L


 


Carbon Dioxide   (22-30)  mmol/L


 


Anion Gap   mmol/L


 


BUN   (7-17)  mg/dL


 


Creatinine   (0.52-1.04)  mg/dL


 


Est GFR (CKD-EPI)AfAm   (>60 ml/min/1.73 sqM)  


 


Est GFR (CKD-EPI)NonAf   (>60 ml/min/1.73 sqM)  


 


Glucose   (74-99)  mg/dL


 


Calcium   (8.4-10.2)  mg/dL


 


Total Bilirubin   (0.2-1.3)  mg/dL


 


AST   (14-36)  U/L


 


ALT   (4-34)  U/L


 


Alkaline Phosphatase   ()  U/L


 


Total Protein   (6.3-8.2)  g/dL


 


Albumin   (3.5-5.0)  g/dL


 


Urine Color   


 


Urine Appearance   (Clear)  


 


Urine pH   (5.0-8.0)  


 


Ur Specific Gravity   (1.001-1.035)  


 


Urine Protein   (Negative)  


 


Urine Glucose (UA)   (Negative)  


 


Urine Ketones   (Negative)  


 


Urine Blood   (Negative)  


 


Urine Nitrite   (Negative)  


 


Urine Bilirubin   (Negative)  


 


Urine Urobilinogen   (<2.0)  mg/dL


 


Ur Leukocyte Esterase   (Negative)  


 


Urine RBC   (0-5)  /hpf


 


Urine WBC   (0-5)  /hpf


 


Ur Squamous Epith Cells   (0-4)  /hpf


 


Urine Bacteria   (None)  /hpf


 


Urine Mucus   (None)  /hpf


 


Stool Occult Blood   (Negative)  


 


Blood Type   


 


Blood Type Confirm  A Positive  


 


Blood Type Recheck   


 


Bld Type Recheck Status   


 


Antibody Screen   


 


Spec Expiration Date   














Disposition


Clinical Impression: 


 GI bleed, Hematuria





Disposition: ADMITTED AS IP TO THIS [facility]


Condition: Stable


Decision Date: 03/08/21


Decision Time: 16:27

## 2021-03-08 NOTE — P.HPIM
<Jared Parish - Last Filed: 03/08/21 17:06>





History of Present Illness


H&P Date: 03/08/21


Chief Complaint: "Bleeding every time I go to the bathroom"





History of presenting illness:





Patient is a 75-year-old female with a past medical history of hypertension, 

hyperlipidemia, hypothyroidism, oculopharyngeal muscular dystrophy, and atrial 

fibrillation on Eliquis.  Patient presented to the hospital today with a chief 

complaint of "bleeding every time I go to the bathroom."  Patient states that 

every time she urinates she is urinating pure bright red blood and was concerned

that this was vaginal bleeding, however patient had a hysterectomy many years a

go. Pt states this bleeding began about two weeks ago and at first was 

intermitted, but states over the past week it is every time she urinates.  As 

stated above pt is on Eliquis. She denies having any noted melena or 

hematochezia, previous GI bleeds, or experiencing any abdominal pain and/or 

discomfort.  Patient denies having any unintentional weight loss or weight gain,

denies changes in appetite, having any abdominal pain or discomfort, nausea, 

vomiting, urinary frequency, urinary urgency, dysuria, or experiencing any 

recent fevers or chills.  She was seen and fully evaluated in the emergency 

department.  CBC unremarkable with a hemoglobin of 11.8.  Coags normal findings.

 BMP revealing prerenal azotemia with BUN of 28 otherwise unremarkable.  

Urinalysis revealing trace proteins, blood, leukocytes, greater than 182 RBCs, 

and 125 WBCs.  Fecal occult positive. Patient admitted under our services for 

concerns GI bleed, concerns of hemorrhagic cystitis, and prerenal azotemia.  








Physical exam:





General: non toxic, no acute distress, appears at stated age


Derm: warm, dry


Head: atraumatic, normocephalic, symmetric


Eyes: EOMI, no lid lag, anicteric sclera


Mouth: no lip lesion, mucus membranes moist


Cardiovascular: S1-S2 normal with regular rhythm and bradycardic rate, no 

murmur, gallop or rub. Positive posterior tibial pulses bilaterally. 


Lungs: Respirations even, regular, and unlabored on room air.  Lungs clear to 

auscultation bilaterally.  No wheezes, rhonchi, or rales noted.  


Abdominal: Obese abdomen soft, nontender to light palpation tenderness reported 

to left lower quadrant upon deep palpation, no suprapubic tenderness upon p

alpation, no guarding, no appreciable organomegaly or masses.


Ext: No gross muscle atrophy, no edema, no contractures


Neuro: GCS 15, speech clear, no noted focal neuro deficits


Psych: Alert, oriented, appropriate affect 





Plan of care:





Gross hematuria, concerns for hemorrhagic cystitis


-Urinalysis revealing trace proteins, blood, leukocytes, greater than 182 RBCs, 

and 125 WBCs.


-Urine culture to be obtained


-IV antibiotics: Rocephin 1 g every 24 hours pending urinary culture results.


-Consult to urology, Dr. Zapata. 


-Gentle hydration with 0.9% normal saline at 75 mL's per hour.


-May consider bladder ultrasound, based on nephrology recommendations.





Severe sigmoid colonic diverticulosis


-CT abdomen and pelvis revealed severe sigmoid colonic diverticulosis.  Trace at

stranding surrounding the sigmoid colon raising suspicion for mild case of 

diverticulitis.


-Fecal occult positive.


-Following serial CBCs, if hemoglobin drops will consider GI consult.


-Protonix 40 mg daily.  


-Clear liquid diet


-Continue fluid hydration with 0.9% normal saline at 75 mL's per hour.  





Prerenal azotemia 


-Prerenal azotemia with BUN of 28, possibly secondary to dehydration vs acute 

infection secondary to concerns of hemorrhagic cystitis.


-Provide continue gentle hydration with 0.9% normal saline at 75 mL's per hour.


-Continue close monitoring with repeat a.m. labs.





Paroxysmal atrial fibrillation on anticoagulation with Eliquis


-Heparin for DVT prophylaxis until cleared by urology to resume. 





Hypertension


-Monitor vital signs and continue daily medication management with losartan and 

amlodipine.





Hyperlipidemia


-Continue daily medication regimen with atorvastatin 10 mg nightly.





Hypothyroidism


-Continue daily medication regimen with the levothyroxine 75 g each morning.





Oculopharyngeal muscular dystrophy


-Patient denies dysphasia with medications or fluids.  She does report certain 

foods she is unable to swallow such as rice.  However patient reports she is 

aware of the foods she cannot eat and avoids these.


-Supportive care, dietary supplements as needed per patient. 





The patient is admitted with an anticipated less than 2 night stay for 

evaluation of gross hematuria.


Surrogate decision-maker: Patient's son, Mathew Sanchez [phone].


CODE STATUS: Full code 


DVT prophylaxis: Heparin for DVT prophylaxis until cleared by urology to resume.




Discussed with: Patient and her son, Mathew 


Anticipated discharge date: 1-2 days


Anticipated discharge place: Home 


A total of 45 minutes was spent on the care of this complex patient more than 

50% of the time was spent in counseling and care coordination.








Past Medical History


Past Medical History: Hyperlipidemia, Hypertension


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


History of Any Multi-Drug Resistant Organisms: None Reported


Past Surgical History: Appendectomy, Hysterectomy, Orthopedic Surgery


Past Anesthesia/Blood Transfusion Reactions: No Reported Reaction


Past Psychological History: No Psychological Hx Reported


Smoking Status: Never smoker


Past Alcohol Use History: None Reported


Past Drug Use History: None Reported





- Past Family History


  ** Father


Additional Family Medical History / Comment(s): lung





  ** Mother


Additional Family Medical History / Comment(s): pancreas,liver





Medications and Allergies


                                Home Medications











 Medication  Instructions  Recorded  Confirmed  Type


 


Levothyroxine Sodium [Synthroid] 75 mcg PO DAILY 12/23/15 03/08/21 History


 


Losartan Potassium 100 mg PO DAILY 12/23/15 03/08/21 History


 


Simvastatin [Zocor] 20 mg PO DAILY 09/25/18 03/08/21 History


 


Apixaban [Eliquis] 5 mg PO BID 30 Days #60 tab 12/06/20 03/08/21 Rx


 


amLODIPine [Norvasc] 2.5 mg PO DAILY 03/08/21 03/08/21 History








                                    Allergies











Allergy/AdvReac Type Severity Reaction Status Date / Time


 


venom-honey bee Allergy  Anaphylaxis Verified 03/08/21 14:03





[bee venom (honey bee)]     


 


aspirin AdvReac  severe Verified 03/08/21 14:03





   stomach  





   pain  














Physical Exam


Vitals: 


                                   Vital Signs











  Temp Pulse Resp BP Pulse Ox


 


 03/08/21 16:00   57 L  18  146/51  98


 


 03/08/21 15:00    18  


 


 03/08/21 14:25    16  


 


 03/08/21 12:21  97.6 F  60  18  128/55  99








                                Intake and Output











 03/08/21 03/08/21 03/08/21





 06:59 14:59 22:59


 


Other:   


 


  Voiding Method  Diaper 


 


  Weight  80.83 kg 














Results


CBC & Chem 7: 


                                 03/08/21 14:28





                                 03/08/21 15:45


Labs: 


                  Abnormal Lab Results - Last 24 Hours (Table)











  03/08/21 03/08/21 03/08/21 Range/Units





  14:28 14:28 14:28 


 


RBC  3.79 L    (3.80-5.40)  m/uL


 


Chloride     ()  mmol/L


 


BUN     (7-17)  mg/dL


 


Urine Protein    Trace H  (Negative)  


 


Urine Blood    Large H  (Negative)  


 


Ur Leukocyte Esterase    Large H  (Negative)  


 


Urine RBC    >182 H  (0-5)  /hpf


 


Urine WBC    125 H  (0-5)  /hpf


 


Urine Bacteria    Rare H  (None)  /hpf


 


Urine Mucus    Occasional H  (None)  /hpf


 


Stool Occult Blood   Positive H   (Negative)  














  03/08/21 Range/Units





  15:45 


 


RBC   (3.80-5.40)  m/uL


 


Chloride  108 H  ()  mmol/L


 


BUN  28 H  (7-17)  mg/dL


 


Urine Protein   (Negative)  


 


Urine Blood   (Negative)  


 


Ur Leukocyte Esterase   (Negative)  


 


Urine RBC   (0-5)  /hpf


 


Urine WBC   (0-5)  /hpf


 


Urine Bacteria   (None)  /hpf


 


Urine Mucus   (None)  /hpf


 


Stool Occult Blood   (Negative)  














<Bisi Bruno - Last Filed: 03/08/21 18:15>





Physical Exam


Osteopathic Statement: *.  No significant issues noted on an osteopathic stru

ctural exam other than those noted in the History and Physical/Consult.


Vitals: 


                                   Vital Signs











  Temp Pulse Resp BP Pulse Ox


 


 03/08/21 18:00   57 L  18  165/58  98


 


 03/08/21 17:00    18   98


 


 03/08/21 16:00   57 L  18  146/51  98


 


 03/08/21 15:00    18  


 


 03/08/21 14:25    16  


 


 03/08/21 12:21  97.6 F  60  18  128/55  99








                                Intake and Output











 03/08/21 03/08/21 03/08/21





 06:59 14:59 22:59


 


Other:   


 


  Voiding Method  Diaper 


 


  Weight  80.83 kg 














Results


CBC & Chem 7: 


                                 03/08/21 14:28





                                 03/08/21 15:45


Labs: 


                  Abnormal Lab Results - Last 24 Hours (Table)











  03/08/21 03/08/21 03/08/21 Range/Units





  14:28 14:28 14:28 


 


RBC  3.79 L    (3.80-5.40)  m/uL


 


Chloride     ()  mmol/L


 


BUN     (7-17)  mg/dL


 


Urine Protein    Trace H  (Negative)  


 


Urine Blood    Large H  (Negative)  


 


Ur Leukocyte Esterase    Large H  (Negative)  


 


Urine RBC    >182 H  (0-5)  /hpf


 


Urine WBC    125 H  (0-5)  /hpf


 


Urine Bacteria    Rare H  (None)  /hpf


 


Urine Mucus    Occasional H  (None)  /hpf


 


Stool Occult Blood   Positive H   (Negative)  














  03/08/21 Range/Units





  15:45 


 


RBC   (3.80-5.40)  m/uL


 


Chloride  108 H  ()  mmol/L


 


BUN  28 H  (7-17)  mg/dL


 


Urine Protein   (Negative)  


 


Urine Blood   (Negative)  


 


Ur Leukocyte Esterase   (Negative)  


 


Urine RBC   (0-5)  /hpf


 


Urine WBC   (0-5)  /hpf


 


Urine Bacteria   (None)  /hpf


 


Urine Mucus   (None)  /hpf


 


Stool Occult Blood   (Negative)  














Assessment and Plan


Assessment: 


Patient seen and examined independently.  Patient was also seen by Jared Parish NP and case was discussed.  I am in agreement with subjective, physical exam, a

ssessment and plan as written above and amended below.





She is upset that she is to stay overnight, but under surrogate's with concerns.

 She states that she is having blood in the toilet every time she urinates.  

History of a hysterectomy.  Denies fever, chills, nausea, vomiting, diarrhea.  

Denies any blood in her stools.





General: non toxic, no distress, appears at stated age


Derm: warm, dry


Head: atraumatic, normocephalic, symmetric


Eyes: EOMI, no lid lag, anicteric sclera


Mouth: no lip lesion, mucus membranes moist


Cardiovascular: S1S2 reg, no murmur, positive posterior tibial pulse bilateral, 


Lungs: CTA bilateral, no rhonchi, no rales , no accessory muscle use


Abdominal: soft,  nontender to palpation, no guarding, no appreciable 

organomegaly


Ext: no gross muscle atrophy, no edema, no contractures


Neuro:  CN II-XI grossly intact, no focal neuro deficits


Psych: Alert, oriented, appropriate affect 








Patient with blood in the toilet every time she urinates.  CT abdomen and pelvis

reviewed which reveals trace fat stranding around the sigmoid colon with 

possible diverticulitis and severe diverticulosis.  However patient's clinical 

presentation is not consistent with diverticulitis that she has had no fevers, 

chills, significant abdominal pain, or diarrhea.  I am concerned that she is 

having hematuria.  She denies any history of hematuria.  We'll consult urology. 

Serial CBCs. Doubt GI bleed but will monito stools. Of note she was just started

on eliquis during her last admission  where she was found to have A fib. She was

unable to follow-up with Dr. Amaral after her last admission but has seen 

cardiology.  





No smoking history.  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 

years and may have been exposed to heavy chemicals.

## 2021-03-09 LAB
ANION GAP SERPL CALC-SCNC: 12.2 MMOL/L (ref 4–12)
BASOPHILS # BLD AUTO: 0.03 X 10*3/UL (ref 0–0.1)
BASOPHILS NFR BLD AUTO: 0.8 %
BUN SERPL-SCNC: 21 MG/DL (ref 9–27)
BUN/CREAT SERPL: 23.33 RATIO (ref 12–20)
CALCIUM SPEC-MCNC: 9.5 MG/DL (ref 8.7–10.3)
CHLORIDE SERPL-SCNC: 106 MMOL/L (ref 96–109)
CO2 SERPL-SCNC: 22.8 MMOL/L (ref 21.6–31.8)
EOSINOPHIL # BLD AUTO: 0.22 X 10*3/UL (ref 0.04–0.35)
EOSINOPHIL NFR BLD AUTO: 6 %
ERYTHROCYTE [DISTWIDTH] IN BLOOD BY AUTOMATED COUNT: 3.55 M/UL (ref 3.8–5.4)
ERYTHROCYTE [DISTWIDTH] IN BLOOD BY AUTOMATED COUNT: 3.82 X 10*6/UL (ref 4.1–5.2)
ERYTHROCYTE [DISTWIDTH] IN BLOOD BY AUTOMATED COUNT: 4.14 X 10*6/UL (ref 4.1–5.2)
ERYTHROCYTE [DISTWIDTH] IN BLOOD: 13.1 % (ref 11.5–14.5)
ERYTHROCYTE [DISTWIDTH] IN BLOOD: 13.2 % (ref 11.5–14.5)
ERYTHROCYTE [DISTWIDTH] IN BLOOD: 13.4 % (ref 11.5–15.5)
GLUCOSE SERPL-MCNC: 78 MG/DL (ref 70–110)
HCT VFR BLD AUTO: 33.9 % (ref 34–46)
HCT VFR BLD AUTO: 36.7 % (ref 37.2–46.3)
HCT VFR BLD AUTO: 40.1 % (ref 37.2–46.3)
HGB BLD-MCNC: 11.3 GM/DL (ref 11.4–16)
HGB BLD-MCNC: 11.8 G/DL (ref 12–15)
HGB BLD-MCNC: 12.7 G/DL (ref 12–15)
LYMPHOCYTES # SPEC AUTO: 1.51 X 10*3/UL (ref 0.9–5)
LYMPHOCYTES NFR SPEC AUTO: 41.1 %
MAGNESIUM SPEC-SCNC: 1.8 MG/DL (ref 1.5–2.4)
MCH RBC QN AUTO: 30.7 PG (ref 27–32)
MCH RBC QN AUTO: 30.9 PG (ref 27–32)
MCH RBC QN AUTO: 31.9 PG (ref 25–35)
MCHC RBC AUTO-ENTMCNC: 31.7 G/DL (ref 32–37)
MCHC RBC AUTO-ENTMCNC: 32.2 G/DL (ref 32–37)
MCHC RBC AUTO-ENTMCNC: 33.4 G/DL (ref 31–37)
MCV RBC AUTO: 95.5 FL (ref 80–100)
MCV RBC AUTO: 96.1 FL (ref 80–97)
MCV RBC AUTO: 96.9 FL (ref 80–97)
MONOCYTES # BLD AUTO: 0.37 X 10*3/UL (ref 0.2–1)
MONOCYTES NFR BLD AUTO: 10.1 %
NEUTROPHILS # BLD AUTO: 1.53 X 10*3/UL (ref 1.8–7.7)
NEUTROPHILS NFR BLD AUTO: 41.7 %
PLATELET # BLD AUTO: 146 K/UL (ref 150–450)
PLATELET # BLD AUTO: 165 X 10*3/UL (ref 140–440)
PLATELET # BLD AUTO: 172 X 10*3/UL (ref 140–440)
POTASSIUM SERPL-SCNC: 3.9 MMOL/L (ref 3.5–5.5)
SODIUM SERPL-SCNC: 141 MMOL/L (ref 135–145)
WBC # BLD AUTO: 3.67 X 10*3/UL (ref 4.5–10)
WBC # BLD AUTO: 3.69 X 10*3/UL (ref 4.5–10)
WBC # BLD AUTO: 3.8 K/UL (ref 3.8–10.6)

## 2021-03-09 RX ADMIN — LEVOTHYROXINE SODIUM SCH MCG: 75 TABLET ORAL at 05:18

## 2021-03-09 RX ADMIN — HEPARIN SODIUM SCH: 5000 INJECTION, SOLUTION INTRAVENOUS; SUBCUTANEOUS at 00:30

## 2021-03-09 RX ADMIN — LOSARTAN POTASSIUM SCH MG: 50 TABLET, FILM COATED ORAL at 08:27

## 2021-03-09 RX ADMIN — PANTOPRAZOLE SODIUM SCH MG: 40 INJECTION, POWDER, FOR SOLUTION INTRAVENOUS at 08:27

## 2021-03-09 RX ADMIN — ATORVASTATIN CALCIUM SCH MG: 10 TABLET, FILM COATED ORAL at 08:27

## 2021-03-09 RX ADMIN — HEPARIN SODIUM SCH: 5000 INJECTION, SOLUTION INTRAVENOUS; SUBCUTANEOUS at 08:27

## 2021-03-09 RX ADMIN — CEFAZOLIN SCH MLS/HR: 330 INJECTION, POWDER, FOR SOLUTION INTRAMUSCULAR; INTRAVENOUS at 08:29

## 2021-03-09 RX ADMIN — HEPARIN SODIUM SCH UNIT: 5000 INJECTION, SOLUTION INTRAVENOUS; SUBCUTANEOUS at 16:30

## 2021-03-09 RX ADMIN — CEFAZOLIN SCH MLS/HR: 330 INJECTION, POWDER, FOR SOLUTION INTRAMUSCULAR; INTRAVENOUS at 18:14

## 2021-03-09 RX ADMIN — HEPARIN SODIUM SCH UNIT: 5000 INJECTION, SOLUTION INTRAVENOUS; SUBCUTANEOUS at 20:13

## 2021-03-09 NOTE — P.PN
Subjective


Progress Note Date: 03/09/21


Principal diagnosis: 





Hemorrhagic cystitis











Hospital Course:





Patient is a 75-year-old female with a past medical history of hypertension, 

hyperlipidemia, hypothyroidism, oculopharyngeal muscular dystrophy, and atrial 

fibrillation on Eliquis.  Patient presented to the hospital today with a chief 

complaint of "bleeding every time I go to the bathroom."  Patient states that 

every time she urinates she is urinating pure bright red blood and was concerned

that this was vaginal bleeding, however patient had a hysterectomy many years 

ago. Pt states this bleeding began about two weeks ago and at first was 

intermitted, but states over the past week it is every time she urinates.  As 

stated above pt is on Eliquis. She denies having any noted melena or 

hematochezia, previous GI bleeds, or experiencing any abdominal pain and/or 

discomfort.  Patient denies having any unintentional weight loss or weight gain,

denies changes in appetite, having any abdominal pain or discomfort, nausea, 

vomiting, urinary frequency, urinary urgency, dysuria, or experiencing any 

recent fevers or chills.  She was seen and fully evaluated in the emergency 

department.  CBC unremarkable with a hemoglobin of 11.8.  Coags normal findings.

 BMP revealing prerenal azotemia with BUN of 28 otherwise unremarkable.  

Urinalysis revealing trace proteins, blood, leukocytes, greater than 182 RBCs, 

and 125 WBCs.  Fecal occult positive. Patient admitted under our services for 

concerns GI bleed, concerns of hemorrhagic cystitis, and prerenal azotemia. Pt 

was started on IV rocephin for tx of hemorrhagic cystitis, eliquis was held 

pending urology consult, and a CT abdomen and pelvis without contrast was 

obtained revealing severe sigmoid colonic diverticulosis with trace fat 

stranding surrounding the sigmoid colon raising suspicion for mild case of 

diverticulitis. CBC trended, no dropping of hemoglobin. Recommending pt follow 

up outpatient with GI for colonoscopy. Clinical course pending urology 

consultation and recommendations. 








Physical exam:


Patient was seen and evaluated at the bedside this morning.  She denied having 

any complaints or concerns.  She reports continued hematuria, but stated that it

has improved and is no longer pure blood.  Patient continues to deny having any 

pain or complaints.  Hemoglobin has remained stable and awaiting morning labs to

result for further evaluation.  Patient continues to deny having any episodes of

melena or hematochezia, dizziness, lightheadedness, chest pain, palpitations, 

shortness of breath, abdominal pain, nausea, vomiting, CVA pain or tenderness, 

or any other complaints at this time. Awaiting urology evaluation.





General: non toxic, no acute distress, appears at stated age


Derm: warm, dry


Head: atraumatic, normocephalic, symmetric


Eyes: EOMI, no lid lag, anicteric sclera


Mouth: no lip lesion, mucus membranes moist


Cardiovascular: S1-S2 normal with regular rhythm and bradycardic rate, no 

murmur, gallop or rub. Positive posterior tibial pulses bilaterally. 


Lungs: Respirations even, regular, and unlabored on room air.  Lungs clear to 

auscultation bilaterally.  No wheezes, rhonchi, or rales noted.  


Abdominal: Obese abdomen soft, nontender to palpation, no suprapubic tenderness 

upon palpation, no guarding, no appreciable organomegaly or masses.


Ext: No gross muscle atrophy, no edema, no contractures


Neuro: GCS 15, speech clear, no noted focal neuro deficits


Psych: Alert, oriented, appropriate affect 





Plan of care:





Gross hematuria, concerns for hemorrhagic cystitis


-Urinalysis revealing trace proteins, blood, leukocytes, greater than 182 RBCs, 

and 125 WBCs.


-Urine culture pending.


-IV antibiotics: Rocephin 1 g every 24 hours pending urinary culture results.


-Consult to urology, Dr. Zapata. 


-Gentle hydration with 0.9% normal saline at 75 mL's per hour.


-May consider bladder ultrasound, based on urology recommendations.





Severe sigmoid colonic diverticulosis


-CT abdomen and pelvis revealed severe sigmoid colonic diverticulosis with trace

fat stranding surrounding the sigmoid colon raising suspicion for mild case of 

diverticulitis.


-Fecal occult positive.


-Serial CBCs stable, pt has no complaints or concerns for diverticulitis, 

recommend following up outpatient with GI for colonoscopy. 


-Protonix 40 mg daily.  


-Advance to heart healthy diet.





Prerenal azotemia 


-Prerenal azotemia with BUN of 28, possibly secondary to dehydration vs acute 

infection secondary to concerns of hemorrhagic cystitis.


-Provide continue gentle hydration with 0.9% normal saline at 75 mL's per hour.


-Continue close monitoring with repeat a.m. labs.





Paroxysmal atrial fibrillation on anticoagulation with Eliquis


-Heparin for DVT prophylaxis until cleared by urology to resume. 





Hypertension


-Monitor vital signs and continue daily medication management with losartan and 

amlodipine.





Hyperlipidemia


-Continue daily medication regimen with atorvastatin 10 mg nightly.





Hypothyroidism


-Continue daily medication regimen with the levothyroxine 75 g each morning.





Oculopharyngeal muscular dystrophy


-Patient denies dysphasia with medications or fluids.  She does report certain 

foods she is unable to swallow such as rice.  However patient reports she is 

aware of the foods she cannot eat and avoids these.


-Supportive care, dietary supplements as needed per patient. 








Surrogate decision-maker: Patient's son, Mathew Sanchez [phone].


CODE STATUS: Full code 


DVT prophylaxis: Heparin for DVT prophylaxis until cleared by urology to resume 

Eliquis. 


Discussed with: Patient and her son, Mathew 


Anticipated discharge date: 1-2 days


Anticipated discharge place: Home 


A total of 45 minutes was spent on the care of this complex patient more than 

50% of the time was spent in counseling and care coordination.





Objective





- Vital Signs


Vital signs: 


                                   Vital Signs











Temp  97.6 F   03/09/21 07:00


 


Pulse  47 L  03/09/21 07:00


 


Resp  16   03/09/21 07:00


 


BP  186/77   03/09/21 07:00


 


Pulse Ox  98   03/09/21 07:00








                                 Intake & Output











 03/08/21 03/09/21 03/09/21





 18:59 06:59 18:59


 


Weight 80.83 kg  80.83 kg


 


Other:   


 


  Voiding Method Diaper Diaper Diaper


 


  # Voids  1 1














- Labs


CBC & Chem 7: 


                                 03/09/21 02:47





                                 03/08/21 15:45


Labs: 


                  Abnormal Lab Results - Last 24 Hours (Table)











  03/08/21 03/08/21 03/08/21 Range/Units





  14:28 14:28 14:28 


 


RBC  3.79 L    (3.80-5.40)  m/uL


 


Hgb     (11.4-16.0)  gm/dL


 


Hct     (34.0-46.0)  %


 


Plt Count     (150-450)  k/uL


 


Chloride     ()  mmol/L


 


BUN     (7-17)  mg/dL


 


Urine Protein    Trace H  (Negative)  


 


Urine Blood    Large H  (Negative)  


 


Ur Leukocyte Esterase    Large H  (Negative)  


 


Urine RBC    >182 H  (0-5)  /hpf


 


Urine WBC    125 H  (0-5)  /hpf


 


Urine Bacteria    Rare H  (None)  /hpf


 


Urine Mucus    Occasional H  (None)  /hpf


 


Stool Occult Blood   Positive H   (Negative)  














  03/08/21 03/08/21 03/09/21 Range/Units





  15:45 20:09 02:47 


 


RBC   3.53 L  3.55 L  (3.80-5.40)  m/uL


 


Hgb   11.3 L  11.3 L  (11.4-16.0)  gm/dL


 


Hct   33.2 L  33.9 L  (34.0-46.0)  %


 


Plt Count    146 L  (150-450)  k/uL


 


Chloride  108 H    ()  mmol/L


 


BUN  28 H    (7-17)  mg/dL


 


Urine Protein     (Negative)  


 


Urine Blood     (Negative)  


 


Ur Leukocyte Esterase     (Negative)  


 


Urine RBC     (0-5)  /hpf


 


Urine WBC     (0-5)  /hpf


 


Urine Bacteria     (None)  /hpf


 


Urine Mucus     (None)  /hpf


 


Stool Occult Blood     (Negative)  








                      Microbiology - Last 24 Hours (Table)











 03/08/21 14:28 Urine Culture - Preliminary





 Urine,Clean Catch 


 


 03/08/21 14:28 Genital Culture - Preliminary





 Cervix

## 2021-03-09 NOTE — P.GSCN
History of Present Illness


Consult date: 03/09/21


History of present illness: 





75-year-old female came in the hospital with lower urinary tract symptoms and 

gross hematuria.  A urinalysis is suspicious of a urinary tract infection.  The 

patient's hemoglobin was stable.  White count was normal. CT scan of the abdomen

that that did not identify any obvious urologic abnormalities.  The patient has 

no other urologic history.  She is only had an occasional infection.  There is 

no history of stones.  There is no other urologic history.





Review of Systems


All systems: negative





- Constitutional


Denies fever, Denies weight loss





- EENT


Eyes: denies blurred vision


Ears, nose, mouth and throat: Denies dysphagia





- Cardiovascular


Denies chest pain, Denies shortness of breath





- Respiratory


Denies cough, Denies 7





- Gastrointestinal


Reports as per HPI





- Genitourinary


Genitourinary: Denies dysuria, Denies hematuria





- Integumentary


Denies rash, Denies unusual bruising





- Neurological


Denies headaches, Denies syncope





- Hematologic/Lymphatic


Denies easy bleeding, Denies easy bruising





Past Medical History


Past Medical History: Hyperlipidemia, Hypertension


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


History of Any Multi-Drug Resistant Organisms: None Reported


Past Surgical History: Appendectomy, Hysterectomy, Orthopedic Surgery


Past Anesthesia/Blood Transfusion Reactions: No Reported Reaction


Past Psychological History: No Psychological Hx Reported


Smoking Status: Never smoker


Past Alcohol Use History: None Reported


Past Drug Use History: None Reported





- Past Family History


  ** Father


Additional Family Medical History / Comment(s): lung





  ** Mother


Additional Family Medical History / Comment(s): pancreas,liver





Medications and Allergies


                                Home Medications











 Medication  Instructions  Recorded  Confirmed  Type


 


Levothyroxine Sodium [Synthroid] 75 mcg PO DAILY 12/23/15 03/08/21 History


 


Losartan Potassium 100 mg PO DAILY 12/23/15 03/08/21 History


 


Simvastatin [Zocor] 20 mg PO DAILY 09/25/18 03/08/21 History


 


Apixaban [Eliquis] 5 mg PO BID 30 Days #60 tab 12/06/20 03/08/21 Rx


 


amLODIPine [Norvasc] 2.5 mg PO DAILY 03/08/21 03/08/21 History








                                    Allergies











Allergy/AdvReac Type Severity Reaction Status Date / Time


 


venom-honey bee Allergy  Anaphylaxis Verified 03/08/21 14:03





[bee venom (honey bee)]     


 


aspirin AdvReac  severe Verified 03/08/21 14:03





   stomach  





   pain  














Surgical - Exam


                                   Vital Signs











Temp Pulse Resp BP Pulse Ox


 


 97.6 F   60   18   128/55   99 


 


 03/08/21 12:21  03/08/21 12:21  03/08/21 12:21  03/08/21 12:21  03/08/21 12:21














- General


well developed, well nourished, no distress





- Eyes


PERRL





- Neck


no masses, trachea midline





- Respiratory


normal expansion





- Cardiovascular


Rhythm: regular





- Abdomen


Abdomen: soft, non tender





- Integumentary


no rash





- Neurologic


normal coordination, normal sensation





- Musculoskeletal


normal posture





- Psychiatric


oriented to time, oriented to person, oriented to place, speech is normal, 

memory intact





Results





- Labs





                                 03/09/21 08:18





                                 03/09/21 08:18


                  Abnormal Lab Results - Last 24 Hours (Table)











  03/08/21 03/08/21 03/08/21 Range/Units





  14:28 14:28 14:28 


 


RBC  3.79 L    (3.80-5.40)  m/uL


 


Hgb     (11.4-16.0)  gm/dL


 


Hct     (34.0-46.0)  %


 


Plt Count     (150-450)  k/uL


 


Chloride     ()  mmol/L


 


BUN     (7-17)  mg/dL


 


Urine Protein    Trace H  (Negative)  


 


Urine Blood    Large H  (Negative)  


 


Ur Leukocyte Esterase    Large H  (Negative)  


 


Urine RBC    >182 H  (0-5)  /hpf


 


Urine WBC    125 H  (0-5)  /hpf


 


Urine Bacteria    Rare H  (None)  /hpf


 


Urine Mucus    Occasional H  (None)  /hpf


 


Stool Occult Blood   Positive H   (Negative)  














  03/08/21 03/08/21 03/09/21 Range/Units





  15:45 20:09 02:47 


 


RBC   3.53 L  3.55 L  (3.80-5.40)  m/uL


 


Hgb   11.3 L  11.3 L  (11.4-16.0)  gm/dL


 


Hct   33.2 L  33.9 L  (34.0-46.0)  %


 


Plt Count    146 L  (150-450)  k/uL


 


Chloride  108 H    ()  mmol/L


 


BUN  28 H    (7-17)  mg/dL


 


Urine Protein     (Negative)  


 


Urine Blood     (Negative)  


 


Ur Leukocyte Esterase     (Negative)  


 


Urine RBC     (0-5)  /hpf


 


Urine WBC     (0-5)  /hpf


 


Urine Bacteria     (None)  /hpf


 


Urine Mucus     (None)  /hpf


 


Stool Occult Blood     (Negative)  








                      Microbiology - Last 24 Hours (Table)











 03/08/21 14:28 Urine Culture - Preliminary





 Urine,Clean Catch 


 


 03/08/21 14:28 Genital Culture - Preliminary





 Cervix 








                                 Diabetes panel











  03/08/21 Range/Units





  15:45 


 


Sodium  141  (137-145)  mmol/L


 


Potassium  4.1  (3.5-5.1)  mmol/L


 


Chloride  108 H  ()  mmol/L


 


Carbon Dioxide  25  (22-30)  mmol/L


 


BUN  28 H  (7-17)  mg/dL


 


Creatinine  0.93  (0.52-1.04)  mg/dL


 


Glucose  97  (74-99)  mg/dL


 


Calcium  9.2  (8.4-10.2)  mg/dL


 


AST  27  (14-36)  U/L


 


ALT  21  (4-34)  U/L


 


Alkaline Phosphatase  57  ()  U/L


 


Total Protein  6.6  (6.3-8.2)  g/dL


 


Albumin  3.7  (3.5-5.0)  g/dL








                                  Calcium panel











  03/08/21 Range/Units





  15:45 


 


Calcium  9.2  (8.4-10.2)  mg/dL


 


Albumin  3.7  (3.5-5.0)  g/dL








                                 Pituitary panel











  03/08/21 Range/Units





  15:45 


 


Sodium  141  (137-145)  mmol/L


 


Potassium  4.1  (3.5-5.1)  mmol/L


 


Chloride  108 H  ()  mmol/L


 


Carbon Dioxide  25  (22-30)  mmol/L


 


BUN  28 H  (7-17)  mg/dL


 


Creatinine  0.93  (0.52-1.04)  mg/dL


 


Glucose  97  (74-99)  mg/dL


 


Calcium  9.2  (8.4-10.2)  mg/dL








                                  Adrenal panel











  03/08/21 Range/Units





  15:45 


 


Sodium  141  (137-145)  mmol/L


 


Potassium  4.1  (3.5-5.1)  mmol/L


 


Chloride  108 H  ()  mmol/L


 


Carbon Dioxide  25  (22-30)  mmol/L


 


BUN  28 H  (7-17)  mg/dL


 


Creatinine  0.93  (0.52-1.04)  mg/dL


 


Glucose  97  (74-99)  mg/dL


 


Calcium  9.2  (8.4-10.2)  mg/dL


 


Total Bilirubin  0.7  (0.2-1.3)  mg/dL


 


AST  27  (14-36)  U/L


 


ALT  21  (4-34)  U/L


 


Alkaline Phosphatase  57  ()  U/L


 


Total Protein  6.6  (6.3-8.2)  g/dL


 


Albumin  3.7  (3.5-5.0)  g/dL














- Imaging


CT scan - abdomen: report reviewed, image reviewed


CT scan - pelvis: report reviewed, image reviewed





Assessment and Plan


Assessment: 





Impression: Hemorrhagic cystitis aggravated by anticoagulation.





Recommendations: Patient should continue on antibiotics and get culture specific

antibiotics.  She can be discharged home once the cultures back.  I would like 

to see her in the office in one to 2 weeks for cystoscopy.

## 2021-03-10 VITALS
RESPIRATION RATE: 16 BRPM | DIASTOLIC BLOOD PRESSURE: 87 MMHG | HEART RATE: 55 BPM | TEMPERATURE: 97.6 F | SYSTOLIC BLOOD PRESSURE: 155 MMHG

## 2021-03-10 LAB
BASOPHILS # BLD AUTO: 0.03 X 10*3/UL (ref 0–0.1)
BASOPHILS NFR BLD AUTO: 0.9 %
EOSINOPHIL # BLD AUTO: 0.21 X 10*3/UL (ref 0.04–0.35)
EOSINOPHIL NFR BLD AUTO: 6 %
ERYTHROCYTE [DISTWIDTH] IN BLOOD BY AUTOMATED COUNT: 3.48 X 10*6/UL (ref 4.1–5.2)
ERYTHROCYTE [DISTWIDTH] IN BLOOD: 13.2 % (ref 11.5–14.5)
HCT VFR BLD AUTO: 32.7 % (ref 37.2–46.3)
HGB BLD-MCNC: 10.6 G/DL (ref 12–15)
LYMPHOCYTES # SPEC AUTO: 1.58 X 10*3/UL (ref 0.9–5)
LYMPHOCYTES NFR SPEC AUTO: 45.1 %
MCH RBC QN AUTO: 30.5 PG (ref 27–32)
MCHC RBC AUTO-ENTMCNC: 32.4 G/DL (ref 32–37)
MCV RBC AUTO: 94 FL (ref 80–97)
MONOCYTES # BLD AUTO: 0.35 X 10*3/UL (ref 0.2–1)
MONOCYTES NFR BLD AUTO: 10 %
NEUTROPHILS # BLD AUTO: 1.32 X 10*3/UL (ref 1.8–7.7)
NEUTROPHILS NFR BLD AUTO: 37.7 %
PLATELET # BLD AUTO: 159 X 10*3/UL (ref 140–440)
WBC # BLD AUTO: 3.5 X 10*3/UL (ref 4.5–10)

## 2021-03-10 RX ADMIN — HEPARIN SODIUM SCH UNIT: 5000 INJECTION, SOLUTION INTRAVENOUS; SUBCUTANEOUS at 08:46

## 2021-03-10 RX ADMIN — PANTOPRAZOLE SODIUM SCH MG: 40 INJECTION, POWDER, FOR SOLUTION INTRAVENOUS at 08:46

## 2021-03-10 RX ADMIN — ATORVASTATIN CALCIUM SCH MG: 10 TABLET, FILM COATED ORAL at 08:46

## 2021-03-10 RX ADMIN — LOSARTAN POTASSIUM SCH MG: 50 TABLET, FILM COATED ORAL at 08:46

## 2021-03-10 RX ADMIN — LEVOTHYROXINE SODIUM SCH MCG: 75 TABLET ORAL at 06:15

## 2021-03-10 NOTE — P.DS
Providers


Date of admission: 


03/08/21 16:16





Expected date of discharge: 03/10/21


Attending physician: 


Bisi Bruno DO





Consults: 





                                        





03/08/21 17:09


Consult Physician Routine 


   Consulting Provider: Moose Zapata


   Consult Reason/Comments: gross hematuria, concerns for hemorrhagic cystits


   Do you want consulting provider notified?: Yes











Primary care physician: 


Akil Amaral MD





Hospital Course: 





Patient is a 75-year-old female with a past medical history of hypertension, 

hyperlipidemia, hypothyroidism, oculopharyngeal muscular dystrophy, and atrial 

fibrillation on Eliquis.  Patient presented to the hospital with hematuria.  

Patient was evaluated in the ER and admitted to the hospital for further 

management of her medical problems noted below.





Gross hematuria, concerns for hemorrhagic cystitis


-Urinalysis showed evidence of UTI but urine culture showed no significant 

growth.  Patient received 2 days of IV ceftriaxone in the hospital.  He would 

finish antibiotic course with Keflex for 3 more days.


-Patient was seen and evaluated by urology.  No procedure done during this 

admission.  Advised to follow-up in the office within the next 10 days.


-Hematuria improved during this hospital stay and urine was clearing up.  

Patient was advised to hold her Eliquis for an additional 3 more days.





Severe sigmoid colonic diverticulosis


-No clinical evidence of acute diverticulitis





Patient would be discharged home in a stable condition.  She will follow up as 

directed.





Patient Condition at Discharge: Stable





Plan - Discharge Summary


Discharge Rx Participant: Yes


New Discharge Prescriptions: 


New


   Cephalexin [Keflex] 500 mg PO Q8HR 3 Days #9 cap





Continue


   Levothyroxine Sodium [Synthroid] 75 mcg PO DAILY


   Losartan Potassium 100 mg PO DAILY


   Simvastatin [Zocor] 20 mg PO DAILY


   Apixaban [Eliquis] 5 mg PO BID 30 Days #60 tab


   amLODIPine [Norvasc] 2.5 mg PO DAILY


Discharge Medication List





Levothyroxine Sodium [Synthroid] 75 mcg PO DAILY 12/23/15 [History]


Losartan Potassium 100 mg PO DAILY 12/23/15 [History]


Simvastatin [Zocor] 20 mg PO DAILY 09/25/18 [History]


Apixaban [Eliquis] 5 mg PO BID 30 Days #60 tab 12/06/20 [Rx]


amLODIPine [Norvasc] 2.5 mg PO DAILY 03/08/21 [History]


Cephalexin [Keflex] 500 mg PO Q8HR 3 Days #9 cap 03/10/21 [Rx]








Follow up Appointment(s)/Referral(s): 


Akil Amaral MD [Primary Care Provider] - 1-2 days


Moose Zapata MD [STAFF PHYSICIAN] - 2 Weeks (for cystoscopy)


Discharge Disposition: HOME SELF-CARE

## 2021-06-08 ENCOUNTER — HOSPITAL ENCOUNTER (OUTPATIENT)
Dept: HOSPITAL 47 - RADFLMAIN | Age: 76
Discharge: HOME | End: 2021-06-08
Attending: OTOLARYNGOLOGY
Payer: MEDICARE

## 2021-06-08 DIAGNOSIS — J39.2: Primary | ICD-10-CM

## 2021-06-08 DIAGNOSIS — R47.02: ICD-10-CM

## 2021-06-08 PROCEDURE — 74230 X-RAY XM SWLNG FUNCJ C+: CPT

## 2021-06-08 NOTE — FL
EXAMINATION TYPE: FL barium swallow w video

 

DATE OF EXAM: 6/8/2021

 

CLINICAL HISTORY: 75-year-old female R1 3.10, unspecified Dysphagia. Patient with trouble swallowing.


 

TECHNIQUE:  Deglutition study is performed utilizing thin liquid barium, and pureed consistency. Tota
l fluoroscopy time: 2 minutes 21 seconds.

Total images: One image was saved. Real-time fluoroscopy support was provided to speech pathology.

 

COMPARISON: None.

 

FINDINGS: 

There is poor pharyngeal peristalsis. There is decreased global movement including tongue base moveme
nt. Severe CP muscle hypertrophy is noted. Nearly the entire bolus of puree consistency lodges in the
 piriform sinuses and vallecula due to the severe CP muscle hypertrophy. There are episodes of deep p
enetration with attempted liquid wash. No chintan aspiration seen.

 

 

IMPRESSION:  

 

1. Severe CP hypertrophy causing a relative obstruction. Episodes of deep penetration occur with an a
ttempted liquid wash.

2. Pharyngeal dysphasia. Consider MRI to exclude brainstem pathology.

3. Direct visualization/manometry can also be considered to assess the severe CP hypertrophy and more
 distal esophagus.

4. Recommend assessing patient's nutritional status due to the relative obstruction.

 

Please refer to speech therapist notes for further details if necessary.

## 2021-12-02 ENCOUNTER — HOSPITAL ENCOUNTER (OUTPATIENT)
Dept: HOSPITAL 47 - RADBDWWP | Age: 76
Discharge: HOME | End: 2021-12-02
Attending: INTERNAL MEDICINE
Payer: MEDICARE

## 2021-12-02 DIAGNOSIS — M85.89: Primary | ICD-10-CM

## 2021-12-02 PROCEDURE — 77080 DXA BONE DENSITY AXIAL: CPT

## 2021-12-02 NOTE — BD
EXAMINATION TYPE: Axial Bone Density

 

DATE OF EXAM: 2021

 

COMPARISON: 2015

 

CLINICAL HISTORY:

 

Height:  59 IN

Weight:  161 LBS

 

 

RISK FACTORS 

HISTORY OF: 

Active: LIMITED

Postmenopausal woman: AGE 49

Lost more than 2 inches in height since high school: YES 4"

 

MEDICATIONS: 

Thyroid Medications: YES 

Which medication: Levothyroxine

How Lon+ YEARS

Additional Medications: LEVOTHYROXINE, LISINOPRIL, SIMVASTATIN, LOSARTAN, FERROUS SULFATE 

 

 

 

 

 

EXAM MEASUREMENTS: 

Bone mineral densitometry was performed using the [employer] System.

Bone mineral density as measured about the Lumbar spine is:  

----- L1-L4(G/cm2): 1.422

T Score Values are as follows:

----- L2: 2.0

----- L3: 3.2

----- L4: 2.4

----- L1-L4: 2.0

Bone mineral density has: Decreased -5.8ince study of: 2015

 

Bone mineral density about the R hip (g/cm2): 0.943

Bone mineral density about the L hip (g/cm2): 0.871

T Score values are as follows:

-----R Neck: -0.7

-----L Neck: -1.2

-----R Total: -0.8

-----L Total: -1.5

Bone mineral density has: Decreased -15.2nce study of: 2015

 

 

IMPRESSION:

Osteopenia

 

NOTE:  T-SCORE=SD OF THE YOUNG ADULT MEAN.

## 2021-12-10 ENCOUNTER — HOSPITAL ENCOUNTER (OUTPATIENT)
Dept: HOSPITAL 47 - RADCTMAIN | Age: 76
Discharge: HOME | End: 2021-12-10
Attending: INTERNAL MEDICINE
Payer: MEDICARE

## 2021-12-10 DIAGNOSIS — R63.4: Primary | ICD-10-CM

## 2021-12-10 DIAGNOSIS — R05.9: ICD-10-CM

## 2021-12-10 LAB — BUN SERPL-SCNC: 15 MG/DL (ref 7–17)

## 2021-12-10 PROCEDURE — 74177 CT ABD & PELVIS W/CONTRAST: CPT

## 2021-12-10 PROCEDURE — 36415 COLL VENOUS BLD VENIPUNCTURE: CPT

## 2021-12-10 PROCEDURE — 71260 CT THORAX DX C+: CPT

## 2021-12-10 PROCEDURE — 82565 ASSAY OF CREATININE: CPT

## 2021-12-10 PROCEDURE — 84520 ASSAY OF UREA NITROGEN: CPT

## 2021-12-10 NOTE — CT
EXAMINATION TYPE: CT ChestAbdPelvis w con

 

DATE OF EXAM: 12/10/2021

 

COMPARISON: 3/8/2021

 

HISTORY: abnormal weight loss

 

CT DLP: 1804 mGycm

 

CONTRAST: 

CT scan of the chest, abdomen and pelvis is performed with Oral Contrast and with IV Contrast, patien
t injected with 80 mL of Isovue 300.

 

CT  Chest:

LUNGS: The lungs are clear and free of infiltrate or atelectasis.  No pulmonary nodule or mass is det
ected.  No pleural effusion or CT evidence of interstitial lung disease.

 

MEDIASTINUM:  Thoracic aorta is of normal caliber.  The heart is not enlarged.  No evidence for media
stinal mass or adenopathy.

 

HILAR STRUCTURES: No evidence for mass.  No hilar adenopathy is appreciated.

 

OTHER: No significant abnormality.

 

CONTRAST CT ABDOMEN AND PELVIS FINDINGS: 

 

LIVER/GB: Multiple gallstones are noted. No space occupying hepatic lesion. Biliary tree is of normal
 caliber. 

 

PANCREAS:  No inflammation.  No distinct mass. 

 

SPLEEN:  No splenic enlargement.  No lesion seen. 

 

ADRENALS:  No nodule.  No thickening. 

 

KIDNEYS/BLADDER:  No hydronephrosis.  No nephrolithiasis.  No distinct renal mass. 

 

BOWEL: Normal appendix.  Normal bowel caliber.  No inflammation. Severe sigmoid diverticulosis. No di
verticulitis at this time.

 

GENITAL ORGANS:  No gross abnormality. 

 

LYMPH NODES:  No greater than 1cm abdominal or pelvic lymph nodes are appreciated.

 

AORTA: No significant abnormality. 

 

OSSEOUS STRUCTURES: Severe multilevel degenerative disc disease and spondylosis.

 

OTHER:  No significant additional abnormality is seen.  

 

IMPRESSION: 

1. No significant abnormality to account for the patient's symptoms.

## 2021-12-23 ENCOUNTER — HOSPITAL ENCOUNTER (OUTPATIENT)
Dept: HOSPITAL 47 - RADECHMAIN | Age: 76
Discharge: HOME | End: 2021-12-23
Attending: INTERNAL MEDICINE
Payer: MEDICARE

## 2021-12-23 ENCOUNTER — HOSPITAL ENCOUNTER (OUTPATIENT)
Dept: HOSPITAL 47 - RADMAMWWP | Age: 76
Discharge: HOME | End: 2021-12-23
Attending: INTERNAL MEDICINE
Payer: MEDICARE

## 2021-12-23 DIAGNOSIS — I08.3: Primary | ICD-10-CM

## 2021-12-23 DIAGNOSIS — Z78.0: ICD-10-CM

## 2021-12-23 DIAGNOSIS — Z12.31: Primary | ICD-10-CM

## 2021-12-23 PROCEDURE — 77067 SCR MAMMO BI INCL CAD: CPT

## 2021-12-23 PROCEDURE — 77063 BREAST TOMOSYNTHESIS BI: CPT

## 2021-12-23 PROCEDURE — 93306 TTE W/DOPPLER COMPLETE: CPT

## 2021-12-23 NOTE — ECHOF
Referral Reason:R06.02 Shortness of Breath



MEASUREMENTS

--------

HEIGHT: 127.0 cm

WEIGHT: 74.8 kg

BP: 

RVIDd:   2.0 cm     (< 3.3)

IVSd:   1.3 cm     (0.6 - 1.1)

LVIDd:   3.9 cm     (3.9 - 5.3)

LVPWd:   1.6 cm     (0.6 - 1.1)

IVSs:   1.8 cm

LVIDs:   2.4 cm

LVPWs:   1.7 cm

LAESV Index (A-L):   20.48 ml/m

Ao Diam:   2.8 cm     (2.0 - 3.7)

AV Cusp:   2.1 cm     (1.5 - 2.6)

LA Diam:   3.1 cm     (2.7 - 3.8)

MV EXCURSION:   22.451 mm     (> 18.000)

MV EF SLOPE:   86 mm/s     (70 - 150)

EPSS:   0.6 cm

MV E Ulises:   0.45 m/s

MV DecT:   299 ms

MV A Ulises:   0.86 m/s

MV E/A Ratio:   0.52 

AR PHT:   1205 ms

RAP:   5.00 mmHg

RVSP:   14.41 mmHg







FINDINGS

--------

This was a technically good study.

The left ventricular size is normal.   There is moderate concentric left ventricular hypertrophy.   O
verall left ventricular systolic function is normal with, an EF between 55 - 60 %.   The diastolic fi
lling pattern is normal for the age of the patient 9.87.

The right ventricle is normal in size.

The left atrial size is normal.    Normal LA  size by volume 22+/-6 ml/m2.

The right atrial size is normal.

The aortic valve is trileaflet and appears structurally normal.   There is mild aortic regurgitation.


Normal appearing mitral valve.    There is trace mitral regurgitation.

The tricuspid valve appears structurally normal.   Trace tricuspid regurgitation present.   Right suad
tricular systolic pressure is normal at < 35 mmHg.

There is no pulmonic regurgitation present.

The aortic root size is normal.

Normal inferior vena cava with normal inspiratory collapse consistent with estimated right atrial pre
ssure of  5 mmHg.

There is no pericardial effusion.



CONCLUSIONS

--------

1. The left ventricular size is normal.

2. There is moderate concentric left ventricular hypertrophy.

3. Overall left ventricular systolic function is normal with, an EF between 55 - 60 %.

4. The diastolic filling pattern is normal for the age of the patient 9.87

5. There is mild aortic regurgitation.

6. There is trace mitral regurgitation.

7. Trace tricuspid regurgitation present.

8. There is no pericardial effusion.





SONOGRAPHER: Nirali Ruiz RDCS

## 2021-12-27 NOTE — MM
Reason for exam: screening  (asymptomatic).

Last mammogram was performed 6 years ago.



History:

Patient is postmenopausal.



Physical Findings:

A clinical breast exam by your physician is recommended on an annual basis and 

results should be correlated with mammographic findings.



MG 3D Screening Mammo W/Cad

Bilateral CC and MLO view(s) were taken.

Prior study comparison: December 11, 2015, bilateral MG screening mammo w CAD.  

January 7, 2013, bilateral digital screening mammo w/CAD.

There are scattered fibroglandular densities.

Finding: There are typically benign linear coarse calcifications in both breasts. 

There is a chronic nodularity in the right breast.

No significant changes in finding since December 11, 2015 and January 7, 2013.





ASSESSMENT: Benign, BI-RAD 2



RECOMMENDATION:

Routine screening mammogram of both breasts in 1 year.

## 2022-05-17 ENCOUNTER — HOSPITAL ENCOUNTER (OUTPATIENT)
Dept: HOSPITAL 47 - RADXRMAIN | Age: 77
Discharge: HOME | End: 2022-05-17
Attending: INTERNAL MEDICINE
Payer: MEDICARE

## 2022-05-17 DIAGNOSIS — M25.562: Primary | ICD-10-CM

## 2022-05-17 NOTE — XR
EXAMINATION TYPE: XR knee complete LT

 

DATE OF EXAM: 5/17/2022

 

COMPARISON: NONE

 

HISTORY: Pain

 

TECHNIQUE: 3 views submitted

 

FINDINGS: There are postsurgical changes noted. No diagnostic evidence of acute fracture or dislocati
on. No prior exam available for comparison. Vascular calcification suspected. Diffuse osteopenia.

 

IMPRESSION: 

1. Postoperative change with no definite acute fracture.

## 2022-05-23 ENCOUNTER — HOSPITAL ENCOUNTER (OUTPATIENT)
Dept: HOSPITAL 47 - RADCTMAIN | Age: 77
Discharge: HOME | End: 2022-05-23
Attending: INTERNAL MEDICINE
Payer: MEDICARE

## 2022-05-23 DIAGNOSIS — G93.89: Primary | ICD-10-CM

## 2022-05-23 DIAGNOSIS — Z86.011: ICD-10-CM

## 2022-05-23 PROCEDURE — 70450 CT HEAD/BRAIN W/O DYE: CPT

## 2022-05-23 NOTE — CT
EXAMINATION TYPE: CT brain wo con

 

DATE OF EXAM: 5/23/2022

 

COMPARISON: CT dated 7/22/2019

 

HISTORY: Hx of meningioma

 

CT DLP: 1174 mGycm

Automated exposure control for dose reduction was used.

 

TECHNIQUE: CT scan of the brain is performed without IV contrast administration.

 

FINDINGS: 

Brain volume loss changes, likely age-related. Right anterior medial frontal partially calcified dura
l based lesion likely representing a meningioma measuring 13 mm compared to 12 mm previously. No sign
ificant mass effect. Scattered arterial atherosclerotic calcifications. 

 

Partial empty sella. No acute intracranial hemorrhage. No gross acute cortical infarct. No midline sh
ift, herniation or ventriculomegaly. Unremarkable basal cisterns and CP angles. No other gross space-
occupying lesion, vasogenic edema or mass effect.

 

Unremarkable orbits. Clear visualized paranasal sinuses and mastoid air cells. No aggressive bone les
ion.

 

IMPRESSION: 

Slightly larger known right medial frontal partially calcified dural based lesion as described above,
 likely representing a meningioma. No significant mass effect.

## 2022-06-08 ENCOUNTER — HOSPITAL ENCOUNTER (EMERGENCY)
Dept: HOSPITAL 47 - EC | Age: 77
Discharge: HOME | End: 2022-06-08
Payer: MEDICARE

## 2022-06-08 VITALS
SYSTOLIC BLOOD PRESSURE: 119 MMHG | HEART RATE: 81 BPM | RESPIRATION RATE: 18 BRPM | DIASTOLIC BLOOD PRESSURE: 64 MMHG | TEMPERATURE: 97.5 F

## 2022-06-08 DIAGNOSIS — Z79.899: ICD-10-CM

## 2022-06-08 DIAGNOSIS — Z88.6: ICD-10-CM

## 2022-06-08 DIAGNOSIS — I10: ICD-10-CM

## 2022-06-08 DIAGNOSIS — Z91.030: ICD-10-CM

## 2022-06-08 DIAGNOSIS — E78.5: ICD-10-CM

## 2022-06-08 DIAGNOSIS — L50.9: Primary | ICD-10-CM

## 2022-06-08 PROCEDURE — 99282 EMERGENCY DEPT VISIT SF MDM: CPT

## 2022-06-08 NOTE — ED
General Adult HPI





- General


Chief complaint: Skin/Abscess/Foreign Body


Stated complaint: rash


Time Seen by Provider: 06/08/22 12:35


Source: patient, RN notes reviewed, old records reviewed


Mode of arrival: ambulatory


Limitations: no limitations





- History of Present Illness


Initial comments: 





76-year-old female presents for evaluation of rash which is been present for the

past 3 weeks.  She's had evaluation in the emergency department and by her 

primary care physician.  This is an itchy red rash which has been diagnosed as 

hives.  She denies new foods.  No new medications.  No new contact exposure.  

Patient has not had fever.  No abdominal pain or vomiting.  No difficulty 

breathing.  Patient has a been on a steroid taper which did improve the rash.  

She has not taken Benadryl.  She is currently awaiting evaluation by 

dermatology.





- Related Data


                                Home Medications











 Medication  Instructions  Recorded  Confirmed


 


Levothyroxine Sodium [Synthroid] 75 mcg PO DAILY 12/23/15 03/08/21


 


Losartan Potassium 100 mg PO DAILY 12/23/15 03/08/21


 


Simvastatin [Zocor] 20 mg PO DAILY 09/25/18 03/08/21


 


amLODIPine [Norvasc] 2.5 mg PO DAILY 03/08/21 03/08/21








                                  Previous Rx's











 Medication  Instructions  Recorded


 


Apixaban [Eliquis] 5 mg PO BID 30 Days #60 tab 12/06/20


 


Cephalexin [Keflex] 500 mg PO Q8HR 3 Days #9 cap 03/10/21


 


methylPREDNISolone Dose Pack 4 mg PO AS DIRECTED #21 tab 05/24/22





[Medrol Dose Pack]  


 


Famotidine [Pepcid] 20 mg PO DAILY #30 tablet 06/08/22


 


diphenhydrAMINE [Benadryl] 25 mg PO TID PRN #21 capsule 06/08/22


 


methylPREDNISolone Dose Pack 4 mg PO AS DIRECTED #21 packet 06/08/22





[Medrol Dose Pack]  











                                    Allergies











Allergy/AdvReac Type Severity Reaction Status Date / Time


 


venom-honey bee Allergy  Anaphylaxis Verified 06/08/22 12:30





[bee venom (honey bee)]     


 


aspirin AdvReac  severe Verified 06/08/22 12:30





   stomach  





   pain  














Review of Systems


ROS Statement: 


Those systems with pertinent positive or pertinent negative responses have been 

documented in the HPI.





ROS Other: All systems not noted in ROS Statement are negative.





Past Medical History


Past Medical History: Hyperlipidemia, Hypertension


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


History of Any Multi-Drug Resistant Organisms: None Reported


Past Surgical History: Appendectomy, Hysterectomy, Orthopedic Surgery


Past Anesthesia/Blood Transfusion Reactions: No Reported Reaction


Past Psychological History: No Psychological Hx Reported


Smoking Status: Never smoker


Past Alcohol Use History: None Reported


Past Drug Use History: None Reported





- Past Family History


  ** Father


Additional Family Medical History / Comment(s): lung





  ** Mother


Additional Family Medical History / Comment(s): pancreas,liver





General Exam


Limitations: no limitations


General appearance: alert, in no apparent distress


Head exam: Present: atraumatic, normocephalic


Eye exam: Present: normal appearance, PERRL


ENT exam: Present: normal exam


Neck exam: Present: normal inspection.  Absent: tenderness, meningismus


Respiratory exam: Present: normal lung sounds bilaterally.  Absent: respiratory 

distress, wheezes, rales


Cardiovascular Exam: Present: regular rate, normal rhythm, systolic murmur


GI/Abdominal exam: Present: soft.  Absent: distended, tenderness, guarding


Extremities exam: Present: normal inspection, normal capillary refill


Neurological exam: Present: alert, oriented X3, CN II-XII intact.  Absent: motor

sensory deficit


Psychiatric exam: Present: normal affect, normal mood


Skin exam: Present: warm, urticaria (Patient's back, and bilateral legs)





Course


                                   Vital Signs











  06/08/22





  12:25


 


Temperature 97.7 F


 


Pulse Rate 63


 


Respiratory 20





Rate 


 


Blood Pressure 114/76


 


O2 Sat by Pulse 96





Oximetry 














Medical Decision Making





- Medical Decision Making





76-year-old female with a raised erythematous rash consistent with urticaria.  

She is otherwise well-appearing with stable vitals.  She is currently awaiting 

evaluation for ALLERGIC or cutaneous causes with dermatology.  Patient will be 

started on a steroid taper and will add Benadryl to her medications.  Follow-up 

with primary care physician as well.





Disposition


Clinical Impression: 


 Urticaria





Disposition: HOME SELF-CARE


Condition: Fair


Instructions (If sedation given, give patient instructions):  Urticaria (ED)


Prescriptions: 


diphenhydrAMINE [Benadryl] 25 mg PO TID PRN #21 capsule


 PRN Reason: Rash


methylPREDNISolone Dose Pack [Medrol Dose Pack] 4 mg PO AS DIRECTED #21 packet


Famotidine [Pepcid] 20 mg PO DAILY #30 tablet


Is patient prescribed a controlled substance at d/c from ED?: No


Referrals: 


Maskoni,Bashar, MD [Primary Care Provider] - 1-2 days


Time of Disposition: 12:50

## 2022-10-28 ENCOUNTER — HOSPITAL ENCOUNTER (OUTPATIENT)
Dept: HOSPITAL 47 - RADXRMAIN | Age: 77
Discharge: HOME | End: 2022-10-28
Attending: STUDENT IN AN ORGANIZED HEALTH CARE EDUCATION/TRAINING PROGRAM
Payer: MEDICARE

## 2022-10-28 DIAGNOSIS — M21.172: Primary | ICD-10-CM

## 2022-10-28 NOTE — XR
Left knee Limited

 

HISTORY: Pain in left knee

 

2 views of left knee correlated to prior exam 5/17/2022

 

Patient is status post left knee arthroplasty. There is stable alignment, varus deformity is suspecte
d, remodeling at the level of the tibial component. Bone mineralization is reduced. No evident fractu
re or dislocation. Suprapatellar increased density is consistent with joint effusion. Difficult to ex
clude loose body as on prior, atherosclerotic vascular calcifications are present.

 

IMPRESSION: Findings show a stable appearance. Osteopenia, varus deformity, remodeling at the tibial 
component, comparison with previous exam is available may BE of benefit to assess for interval change
s.

## 2022-12-12 ENCOUNTER — HOSPITAL ENCOUNTER (OUTPATIENT)
Dept: HOSPITAL 47 - EC | Age: 77
Setting detail: OBSERVATION
Discharge: HOME | End: 2022-12-12
Attending: INTERNAL MEDICINE | Admitting: INTERNAL MEDICINE
Payer: MEDICARE

## 2022-12-12 VITALS — SYSTOLIC BLOOD PRESSURE: 169 MMHG | DIASTOLIC BLOOD PRESSURE: 77 MMHG | HEART RATE: 46 BPM

## 2022-12-12 VITALS — TEMPERATURE: 97.9 F | RESPIRATION RATE: 18 BRPM
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
## 2022-12-12 DIAGNOSIS — E78.5: ICD-10-CM

## 2022-12-12 DIAGNOSIS — J34.2: ICD-10-CM

## 2022-12-12 DIAGNOSIS — Z80.0: ICD-10-CM

## 2022-12-12 DIAGNOSIS — M47.812: ICD-10-CM

## 2022-12-12 DIAGNOSIS — S02.2XXA: ICD-10-CM

## 2022-12-12 DIAGNOSIS — D32.9: ICD-10-CM

## 2022-12-12 DIAGNOSIS — I10: ICD-10-CM
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
## 2022-12-12 DIAGNOSIS — R55: Primary | ICD-10-CM

## 2022-12-12 DIAGNOSIS — I44.0: ICD-10-CM

## 2022-12-12 DIAGNOSIS — Z80.1: ICD-10-CM

## 2022-12-12 DIAGNOSIS — E03.9: ICD-10-CM

## 2022-12-12 DIAGNOSIS — I48.0: ICD-10-CM

## 2022-12-12 DIAGNOSIS — Z90.710: ICD-10-CM

## 2022-12-12 DIAGNOSIS — G71.09: ICD-10-CM

## 2022-12-12 DIAGNOSIS — Z79.899: ICD-10-CM

## 2022-12-12 DIAGNOSIS — R00.1: ICD-10-CM

## 2022-12-12 DIAGNOSIS — Z79.890: ICD-10-CM

## 2022-12-12 LAB
ALBUMIN SERPL-MCNC: 4.2 G/DL (ref 3.5–5)
ALP SERPL-CCNC: 68 U/L (ref 38–126)
ALT SERPL-CCNC: 22 U/L (ref 4–34)
ANION GAP SERPL CALC-SCNC: 8 MMOL/L
AST SERPL-CCNC: 33 U/L (ref 14–36)
BASOPHILS # BLD AUTO: 0.1 K/UL (ref 0–0.2)
BASOPHILS NFR BLD AUTO: 1 %
BUN SERPL-SCNC: 19 MG/DL (ref 7–17)
CALCIUM SPEC-MCNC: 9.2 MG/DL (ref 8.4–10.2)
CHLORIDE SERPL-SCNC: 109 MMOL/L (ref 98–107)
CO2 SERPL-SCNC: 24 MMOL/L (ref 22–30)
EOSINOPHIL # BLD AUTO: 0.2 K/UL (ref 0–0.7)
EOSINOPHIL NFR BLD AUTO: 3 %
ERYTHROCYTE [DISTWIDTH] IN BLOOD BY AUTOMATED COUNT: 4.01 M/UL (ref 3.8–5.4)
ERYTHROCYTE [DISTWIDTH] IN BLOOD: 12.8 % (ref 11.5–15.5)
GLUCOSE SERPL-MCNC: 111 MG/DL (ref 74–99)
HCT VFR BLD AUTO: 37.7 % (ref 34–46)
HGB BLD-MCNC: 12.7 GM/DL (ref 11.4–16)
LYMPHOCYTES # SPEC AUTO: 1.5 K/UL (ref 1–4.8)
LYMPHOCYTES NFR SPEC AUTO: 26 %
MAGNESIUM SPEC-SCNC: 2 MG/DL (ref 1.6–2.3)
MCH RBC QN AUTO: 31.6 PG (ref 25–35)
MCHC RBC AUTO-ENTMCNC: 33.6 G/DL (ref 31–37)
MCV RBC AUTO: 94 FL (ref 80–100)
MONOCYTES # BLD AUTO: 0.4 K/UL (ref 0–1)
MONOCYTES NFR BLD AUTO: 7 %
NEUTROPHILS # BLD AUTO: 3.6 K/UL (ref 1.3–7.7)
NEUTROPHILS NFR BLD AUTO: 61 %
PLATELET # BLD AUTO: 195 K/UL (ref 150–450)
POTASSIUM SERPL-SCNC: 4.4 MMOL/L (ref 3.5–5.1)
PROT SERPL-MCNC: 7.3 G/DL (ref 6.3–8.2)
SODIUM SERPL-SCNC: 141 MMOL/L (ref 137–145)
WBC # BLD AUTO: 5.9 K/UL (ref 3.8–10.6)

## 2022-12-12 PROCEDURE — 99285 EMERGENCY DEPT VISIT HI MDM: CPT

## 2022-12-12 PROCEDURE — 36415 COLL VENOUS BLD VENIPUNCTURE: CPT

## 2022-12-12 PROCEDURE — 83735 ASSAY OF MAGNESIUM: CPT

## 2022-12-12 PROCEDURE — 80053 COMPREHEN METABOLIC PANEL: CPT

## 2022-12-12 PROCEDURE — 93005 ELECTROCARDIOGRAM TRACING: CPT

## 2022-12-12 PROCEDURE — 70486 CT MAXILLOFACIAL W/O DYE: CPT

## 2022-12-12 PROCEDURE — 70450 CT HEAD/BRAIN W/O DYE: CPT

## 2022-12-12 PROCEDURE — 85025 COMPLETE CBC W/AUTO DIFF WBC: CPT

## 2022-12-12 PROCEDURE — 72125 CT NECK SPINE W/O DYE: CPT

## 2022-12-12 NOTE — ED
Fall HPI





- General


Chief Complaint: Fall


Stated Complaint: fall


Time Seen by Provider: 12/12/22 15:09


Source: patient, EMS


Mode of arrival: EMS





- History of Present Illness


Initial Comments: 


Patient is a 76-year-old female who presents to the emergency department for 

evaluation of fall.  Patient states that she tripped over her cane inside of her

home landing on her face on her living room carpet.  Patient is a questionable 

historian.  Patient denies loss of consciousness.  She does not know if she uses

blood thinners however based on documentation patient takes Eliquis for atrial 

fibrillation.  Patient reports mild pain over her nose and right upper face.  

She did have a nosebleed which has since resolved.  She denies fever, chills, 

headache, lightheadedness, dizziness, double vision, blurry vision, chest pain, 

shortness of breath, abdominal pain, nausea, vomiting, burning with urination.








- Related Data


                                Home Medications











 Medication  Instructions  Recorded  Confirmed


 


Levothyroxine Sodium [Synthroid] 75 mcg PO DAILY 12/23/15 12/12/22


 


Losartan Potassium 100 mg PO DAILY 12/23/15 12/12/22


 


Simvastatin [Zocor] 20 mg PO DAILY 09/25/18 12/12/22


 


Cetirizine HCl [Zyrtec] 20 mg PO DAILY 12/12/22 12/12/22











                                    Allergies











Allergy/AdvReac Type Severity Reaction Status Date / Time


 


venom-honey bee Allergy  Anaphylaxis Verified 12/12/22 16:04





[bee venom (honey bee)]     


 


aspirin AdvReac  severe Verified 12/12/22 16:04





   stomach  





   pain  














Review of Systems


ROS Statement: 


Those systems with pertinent positive or pertinent negative responses have been 

documented in the HPI.





ROS Other: All systems not noted in ROS Statement are negative.





Past Medical History


Past Medical History: Hyperlipidemia, Hypertension


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


History of Any Multi-Drug Resistant Organisms: None Reported


Past Surgical History: Appendectomy, Hysterectomy, Orthopedic Surgery


Past Anesthesia/Blood Transfusion Reactions: No Reported Reaction


Past Psychological History: No Psychological Hx Reported


Smoking Status: Never smoker


Past Alcohol Use History: None Reported


Past Drug Use History: None Reported





- Past Family History


  ** Father


Additional Family Medical History / Comment(s): lung





  ** Mother


Additional Family Medical History / Comment(s): pancreas,liver





General Exam


Limitations: no limitations


General appearance: alert, in no apparent distress


Head exam: Present: normocephalic, normal inspection (Mild erythema and swelling

to right periorbital region)


Eye exam: Present: normal appearance, PERRL, EOMI, periorbital swelling (right 

).  Absent: scleral icterus, conjunctival injection


ENT exam: Present: TM's normal bilaterally, other (Resolved bilateral epistaxis)


Neck exam: Present: normal inspection, full ROM.  Absent: tenderness


Respiratory exam: Present: normal lung sounds bilaterally.  Absent: respiratory 

distress, wheezes, rales, rhonchi, stridor


Cardiovascular Exam: Present: normal rhythm, bradycardia, normal heart sounds.  

Absent: regular rate, systolic murmur, diastolic murmur, rubs, gallop, clicks


GI/Abdominal exam: Present: soft, normal bowel sounds.  Absent: distended, 

tenderness, guarding, rebound, rigid


Neurological exam: Present: alert, oriented X3, CN II-XII intact


Psychiatric exam: Present: normal affect, normal mood


Skin exam: Present: warm, dry, intact, normal color.  Absent: rash





Course


                                   Vital Signs











  12/12/22





  15:18


 


Temperature 97.9 F


 


Pulse Rate 49 L


 


Respiratory 18





Rate 


 


Blood Pressure 166/74


 


O2 Sat by Pulse 99





Oximetry 














Medical Decision Making





- Medical Decision Making


This is a 76-year-old female presenting for evaluation of fall. Patient is 

bradycardic at 49 bpm.  Regular rhythm.  Previous documentation does reveal 

variable heart rate with previous bradycardia.  Patient denies beta blocker use.








CT of the brain and C-spine was obtained and interpreted by me which was 

negative for acute process.  CT of the facial bones without contrast obtained 

and interpreted by me which success a nondisplaced nondisplaced left-sided nasal

bone fracture.  There is mild septal deviation from right to left.








EKG obtained interpreted by me which shows sinus bradycardia with first-degree A

V block. Laboratory studies obtained.  Potassium is within normal limits of 4.4 

although there was slight hemolysis.








Results discussed with patient and family.  Patient to be admitted for 

observation. Case discussed with Dr. Malhotra who accepts admission. 








Dr. Baltazar is my attending. 

















- Lab Data


Result diagrams: 


                                 12/12/22 16:02





                                 12/12/22 16:02


                                   Lab Results











  12/12/22 12/12/22 Range/Units





  16:02 16:02 


 


WBC  5.9   (3.8-10.6)  k/uL


 


RBC  4.01   (3.80-5.40)  m/uL


 


Hgb  12.7   (11.4-16.0)  gm/dL


 


Hct  37.7   (34.0-46.0)  %


 


MCV  94.0   (80.0-100.0)  fL


 


MCH  31.6   (25.0-35.0)  pg


 


MCHC  33.6   (31.0-37.0)  g/dL


 


RDW  12.8   (11.5-15.5)  %


 


Plt Count  195   (150-450)  k/uL


 


MPV  9.6   


 


Neutrophils %  61   %


 


Lymphocytes %  26   %


 


Monocytes %  7   %


 


Eosinophils %  3   %


 


Basophils %  1   %


 


Neutrophils #  3.6   (1.3-7.7)  k/uL


 


Lymphocytes #  1.5   (1.0-4.8)  k/uL


 


Monocytes #  0.4   (0-1.0)  k/uL


 


Eosinophils #  0.2   (0-0.7)  k/uL


 


Basophils #  0.1   (0-0.2)  k/uL


 


Sodium   141  (137-145)  mmol/L


 


Potassium   4.4  (3.5-5.1)  mmol/L


 


Chloride   109 H  ()  mmol/L


 


Carbon Dioxide   24  (22-30)  mmol/L


 


Anion Gap   8  mmol/L


 


BUN   19 H  (7-17)  mg/dL


 


Creatinine   0.93  (0.52-1.04)  mg/dL


 


Est GFR (CKD-EPI)AfAm   69  (>60 ml/min/1.73 sqM)  


 


Est GFR (CKD-EPI)NonAf   60  (>60 ml/min/1.73 sqM)  


 


Glucose   111 H  (74-99)  mg/dL


 


Calcium   9.2  (8.4-10.2)  mg/dL


 


Magnesium   2.0  (1.6-2.3)  mg/dL


 


Total Bilirubin   1.3  (0.2-1.3)  mg/dL


 


AST   33  (14-36)  U/L


 


ALT   22  (4-34)  U/L


 


Alkaline Phosphatase   68  ()  U/L


 


Total Protein   7.3  (6.3-8.2)  g/dL


 


Albumin   4.2  (3.5-5.0)  g/dL














Disposition


Clinical Impression: 


 Fall, Nose fracture, Bradycardia





Disposition: ADMITTED AS IP TO THIS HOSP


Condition: Good


Referrals: 


Bobby Aguilar MD [Primary Care Provider] - 1-2 days

## 2022-12-12 NOTE — CT
EXAMINATION TYPE: CT brain karrie wo con

 

DATE OF EXAM: 12/12/2022

 

COMPARISON: 5/23/2022

 

HISTORY: fall, nasal pain, no LOC

 

Unenhanced CT of the brain was performed.  

 

The ventricles, basal cisterns and sulci overlying the cerebral convexities demonstrate mild enlargem
ent.  

 

There is no evidence for intracranial hemorrhage or sulcal effacement.  There is decreased attenuatio
n about the periventricular white matter and deep white matter of both cerebral hemispheres, compatib
le with chronic small vessel ischemia.  

 

No mass effects are seen.  

If symptoms persist consider MRI.  

 

Osseous calvarium is intact. Calcified meningioma noted right frontal region.

 

IMPRESSION:

 

1.  Age related atrophic and chronic small vessel ischemic change without acute intracranial process 
seen at this time.

 

CT Cervical Spine:

 

Unenhanced CT of the cervical spine was performed with bone and soft tissue window settings submitted
.  Coronal and sagittal reconstruction is obtained.  

 

There is normal alignment and prevertebral soft tissues. No evidence for acute cervical fracture .   
Scattered degenerative disc disease and spondylosis. Biapical scarring.   

 

IMPRESSION:

 

1.  No evidence for acute fracture or subluxation of the cervical spine.

## 2022-12-12 NOTE — P.HPIM
History of Present Illness


H&P Date: 12/12/22


Chief Complaint: Fall





76-year-old woman with medical history of hypertension, hypothyroidism, 

hyperlipidemia, paroxysmal atrial fibrillation on Apixiban presented after fall.

 Patient states that she had mechanical fall after tripping on her cane.








In the emergency room, patient was noted to be afebrile, 166/74, heart rate 49, 

99% on room air.  CBC, chemistries, LFTs, were all unremarkable.  Head/cervical 

CT shows age-related atrophic changes and chronic small vessel ischemic change 

without acute process.  Facial CT could not excluded nondisplaced nondepressed 

left nasal bone fracture.





All Systems reviewed and pertinent positives and negatives noted in HPI, all oth

er symptoms are negative





Gen: in no apparent distress, resting comfortably in bed


Eyes: PERRL, no scleral injection or icterus


HENT: normocephalic, atraumatic, good hearing acuity, moist mucous membranes


Neck: no tracheal deviation, full range of motion


Resp: good air exchange, breathing comfortably with no accessory muscle use, no 

tactile fremitus, clear to auscultation bilaterally


CVS: good distal perfusion x 4, no pitting edema, bradycardic, no murmurs


GI: soft, NTTP, ND, no hepatosplenomegaly


: no suprapubic tenderness, no CVAT, hernandez catheter not present


MSK: no clubbing, no cyanosis, no noted contractures of extremities


Skin: no noted rashes, petechiae; temperature of skin is appropriate


Neuro: moving all extremities without signs of weakness, CN II-XII intact


Psych: cooperative, euthymic mood, insight and judgment intact





Labs and imaging reviewed as above





Assessment/plan:





Syncope


Bradycardia


-Admit to observation, telemetry


-Monitor for symptomatic bradycardia


-EKG


-Echocardiogram


-Orthostatics


-IV fluids





Hypertension


Hyperlipidemia


Hypothyroidism


Paroxysmal atrial fibrillation


-Home medications reviewed and reconciled








Patient is full code


DVT prophylaxis covered with Apixiban





Past Medical History


Past Medical History: Hyperlipidemia, Hypertension


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


History of Any Multi-Drug Resistant Organisms: None Reported


Past Surgical History: Appendectomy, Hysterectomy, Orthopedic Surgery


Past Anesthesia/Blood Transfusion Reactions: No Reported Reaction


Past Psychological History: No Psychological Hx Reported


Smoking Status: Never smoker


Past Alcohol Use History: None Reported


Past Drug Use History: None Reported





- Past Family History


  ** Father


Additional Family Medical History / Comment(s): lung





  ** Mother


Additional Family Medical History / Comment(s): pancreas,liver





Medications and Allergies


                                Home Medications











 Medication  Instructions  Recorded  Confirmed  Type


 


Levothyroxine Sodium [Synthroid] 75 mcg PO DAILY 12/23/15 12/12/22 History


 


Losartan Potassium 100 mg PO DAILY 12/23/15 12/12/22 History


 


Simvastatin [Zocor] 20 mg PO DAILY 09/25/18 12/12/22 History


 


Cetirizine HCl [Zyrtec] 20 mg PO DAILY 12/12/22 12/12/22 History








                                    Allergies











Allergy/AdvReac Type Severity Reaction Status Date / Time


 


venom-honey bee Allergy  Anaphylaxis Verified 12/12/22 16:04





[bee venom (honey bee)]     


 


aspirin AdvReac  severe Verified 12/12/22 16:04





   stomach  





   pain  














Physical Exam


Osteopathic Statement: *.  No significant issues noted on an osteopathic 

structural exam other than those noted in the History and Physical/Consult.


Vitals: 


                                   Vital Signs











  Temp Pulse Resp BP Pulse Ox


 


 12/12/22 15:18  97.9 F  49 L  18  166/74  99








                                Intake and Output











 12/12/22 12/12/22 12/12/22





 06:59 14:59 22:59


 


Other:   


 


  Weight   86.183 kg














Results


CBC & Chem 7: 


                                 12/12/22 16:02





                                 12/12/22 16:02


Labs: 


                  Abnormal Lab Results - Last 24 Hours (Table)











  12/12/22 Range/Units





  16:02 


 


Chloride  109 H  ()  mmol/L


 


BUN  19 H  (7-17)  mg/dL


 


Glucose  111 H  (74-99)  mg/dL

## 2022-12-12 NOTE — P.CONS
History of Present Illness





- Reason for Consult


Consult date: 12/12/22





- Chief Complaint


fall, bradycardia





- History of Present Illness





76-year-old woman with medical history of hypertension, hypothyroidism, 

hyperlipidemia, paroxysmal atrial fibrillation on Apixiban presented after fall.

 Patient states that she had mechanical fall after tripping on her cane as she 

was turning to her right.  She has a very clear recollection of the event.  She 

denies having any chest pain, palpitations, flushing, sweats, lightheadedness, 

dizziness prior to her fall.  She denies fevers, chills, nausea, vomiting, 

cough, dyspnea, abdominal pain, constipation, diarrhea, dysuria, dyschezia, 

numbness/weakness of extremities








In the emergency room, patient was noted to be afebrile, 166/74, heart rate 49, 

99% on room air.  CBC, chemistries, LFTs, were all unremarkable.  Head/cervical 

CT shows age-related atrophic changes and chronic small vessel ischemic change 

without acute process.  Facial CT could not excluded nondisplaced nondepressed 

left nasal bone fracture.





All Systems reviewed and pertinent positives and negatives noted in HPI, all 

other symptoms are negative





Gen: in no apparent distress, resting comfortably in bed


Eyes: PERRL, no scleral injection or icterus


HENT: normocephalic, atraumatic, good hearing acuity, moist mucous membranes


Neck: no tracheal deviation, full range of motion


Resp: good air exchange, breathing comfortably with no accessory muscle use, no 

tactile fremitus, clear to auscultation bilaterally


CVS: good distal perfusion x 4, no pitting edema, bradycardic, no murmurs


GI: soft, NTTP, ND, no hepatosplenomegaly


: no suprapubic tenderness, no CVAT, hernandez catheter not present


MSK: no clubbing, no cyanosis, no noted contractures of extremities


Skin: no noted rashes, petechiae; temperature of skin is appropriate


Neuro: moving all extremities without signs of weakness, CN II-XII intact


Psych: cooperative, euthymic mood, insight and judgment intact





Labs and imaging reviewed as above





Assessment/plan:





Syncope secondary to mechanical fall


Asymptomatic Bradycardia


-Patient will be discharged home with instructions to follow-up with cardiology 

within a week


-Counseled the patient that she should due to structural assessment of a heart 

by echocardiogram during follow-up


-Return to the hospital in case of palpitations, lightheadedness, low blood 

pressure, dizziness, multiple falls





Hypertension


Hyperlipidemia


Hypothyroidism


Paroxysmal atrial fibrillation


-Home medications reviewed and reconciled








Patient is full code








Past Medical History


Past Medical History: Hyperlipidemia, Hypertension


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


History of Any Multi-Drug Resistant Organisms: None Reported


Past Surgical History: Appendectomy, Hysterectomy, Orthopedic Surgery


Past Anesthesia/Blood Transfusion Reactions: No Reported Reaction


Past Psychological History: No Psychological Hx Reported


Smoking Status: Never smoker


Past Alcohol Use History: None Reported


Past Drug Use History: None Reported





- Past Family History


  ** Father


Additional Family Medical History / Comment(s): lung





  ** Mother


Additional Family Medical History / Comment(s): pancreas,liver





Medications and Allergies


                                Home Medications











 Medication  Instructions  Recorded  Confirmed  Type


 


Levothyroxine Sodium [Synthroid] 75 mcg PO DAILY 12/23/15 12/12/22 History


 


Losartan Potassium 100 mg PO DAILY 12/23/15 12/12/22 History


 


Simvastatin [Zocor] 20 mg PO DAILY 09/25/18 12/12/22 History


 


Cetirizine HCl [Zyrtec] 20 mg PO DAILY 12/12/22 12/12/22 History








                                    Allergies











Allergy/AdvReac Type Severity Reaction Status Date / Time


 


venom-honey bee Allergy  Anaphylaxis Verified 12/12/22 16:04





[bee venom (honey bee)]     


 


aspirin AdvReac  severe Verified 12/12/22 16:04





   stomach  





   pain  














Physical Exam


Osteopathic Statement: *.  No significant issues noted on an osteopathic 

structural exam other than those noted in the History and Physical/Consult.


Vitals: 


                                   Vital Signs











  Temp Pulse Resp BP Pulse Ox


 


 12/12/22 15:18  97.9 F  49 L  18  166/74  99








                                Intake and Output











 12/12/22 12/12/22 12/12/22





 06:59 14:59 22:59


 


Other:   


 


  Weight   86.183 kg














Results


CBC & Chem 7: 


                                 12/12/22 16:02





                                 12/12/22 16:02


Labs: 


                  Abnormal Lab Results - Last 24 Hours (Table)











  12/12/22 Range/Units





  16:02 


 


Chloride  109 H  ()  mmol/L


 


BUN  19 H  (7-17)  mg/dL


 


Glucose  111 H  (74-99)  mg/dL

## 2022-12-12 NOTE — CT
EXAMINATION TYPE: CT facial bones wo con

 

DATE OF EXAM: 12/12/2022

 

COMPARISON: None

 

HISTORY: fall, nasal pain

 

CT DLP: 965.6 mGycm

 

Unenhanced CT of the facial bones was performed in the axial and coronal planes.  Bone and soft tissu
e window settings are submitted.  

 

Mild paranasal soft tissue swelling noted. Suggestion of nondisplaced nondepressed left-sided nasal b
one fracture. Nasal spine is intact. Mild nasal septal deviation from right to left.

 

 I do not see evidence for additional displaced facial bone fracture or depressed facial bone fractur
e.  

 

The globes are intact.  

 

Paranasal sinuses are well-aerated. 

 

IMPRESSION: 

 

1. I cannot exclude nondisplaced nondepressed left nasal bone fracture.

## 2024-11-04 ENCOUNTER — HOSPITAL ENCOUNTER (OUTPATIENT)
Dept: HOSPITAL 47 - LABWHC1 | Age: 79
Discharge: HOME | End: 2024-11-04
Attending: STUDENT IN AN ORGANIZED HEALTH CARE EDUCATION/TRAINING PROGRAM
Payer: MEDICARE

## 2024-11-04 DIAGNOSIS — R63.4: Primary | ICD-10-CM

## 2024-11-04 LAB
BASOPHILS # BLD AUTO: 0.02 X 10*3/UL (ref 0–0.1)
BASOPHILS NFR BLD AUTO: 0.2 %
EOSINOPHIL # BLD AUTO: 0.11 X 10*3/UL (ref 0.04–0.35)
EOSINOPHIL NFR BLD AUTO: 1.3 %
ERYTHROCYTE [DISTWIDTH] IN BLOOD BY AUTOMATED COUNT: 3.41 X 10*6/UL (ref 4.1–5.2)
ERYTHROCYTE [DISTWIDTH] IN BLOOD: 13.8 % (ref 11.5–14.5)
FERRITIN SERPL-MCNC: 373 NG/ML (ref 10–291)
HCT VFR BLD AUTO: 33.3 % (ref 37.2–46.3)
HGB BLD-MCNC: 10.5 G/DL (ref 12–15)
IMM GRANULOCYTES BLD QL AUTO: 0.6 %
IRON SERPL-MCNC: 23 UG/DL (ref 50–170)
LYMPHOCYTES # SPEC AUTO: 2.02 X 10*3/UL (ref 0.9–5)
LYMPHOCYTES NFR SPEC AUTO: 24.2 %
MCH RBC QN AUTO: 30.8 PG (ref 27–32)
MCHC RBC AUTO-ENTMCNC: 31.5 G/DL (ref 32–37)
MCV RBC AUTO: 97.7 FL (ref 80–97)
MONOCYTES # BLD AUTO: 0.61 X 10*3/UL (ref 0.2–1)
MONOCYTES NFR BLD AUTO: 7.3 %
NEUTROPHILS # BLD AUTO: 5.54 X 10*3/UL (ref 1.8–7.7)
NEUTROPHILS NFR BLD AUTO: 66.4 %
NRBC BLD AUTO-RTO: 0 X 10*3/UL (ref 0–0.01)
PLATELET # BLD AUTO: 172 X 10*3/UL (ref 140–440)
TIBC SERPL-MCNC: 259 UG/DL (ref 228–460)
WBC # BLD AUTO: 8.35 X 10*3/UL (ref 4.5–10)

## 2024-11-04 PROCEDURE — 36415 COLL VENOUS BLD VENIPUNCTURE: CPT

## 2024-11-04 PROCEDURE — 85025 COMPLETE CBC W/AUTO DIFF WBC: CPT

## 2024-11-04 PROCEDURE — 83540 ASSAY OF IRON: CPT

## 2024-11-04 PROCEDURE — 83036 HEMOGLOBIN GLYCOSYLATED A1C: CPT

## 2024-11-04 PROCEDURE — 82306 VITAMIN D 25 HYDROXY: CPT

## 2024-11-04 PROCEDURE — 83550 IRON BINDING TEST: CPT

## 2024-11-04 PROCEDURE — 82728 ASSAY OF FERRITIN: CPT

## 2024-11-04 PROCEDURE — 84443 ASSAY THYROID STIM HORMONE: CPT

## 2024-12-19 ENCOUNTER — HOSPITAL ENCOUNTER (OUTPATIENT)
Dept: HOSPITAL 47 - RADCTMAIN | Age: 79
Discharge: HOME | End: 2024-12-19
Attending: STUDENT IN AN ORGANIZED HEALTH CARE EDUCATION/TRAINING PROGRAM
Payer: MEDICARE

## 2024-12-19 DIAGNOSIS — K57.30: ICD-10-CM

## 2024-12-19 DIAGNOSIS — I70.0: ICD-10-CM

## 2024-12-19 DIAGNOSIS — R63.4: ICD-10-CM

## 2024-12-19 DIAGNOSIS — K80.20: Primary | ICD-10-CM

## 2024-12-19 LAB — BUN SERPL-SCNC: 25 MG/DL (ref 7–17)

## 2024-12-19 PROCEDURE — 71260 CT THORAX DX C+: CPT

## 2024-12-19 PROCEDURE — 36415 COLL VENOUS BLD VENIPUNCTURE: CPT

## 2024-12-19 PROCEDURE — 82565 ASSAY OF CREATININE: CPT

## 2024-12-19 PROCEDURE — 74177 CT ABD & PELVIS W/CONTRAST: CPT

## 2024-12-19 PROCEDURE — 84520 ASSAY OF UREA NITROGEN: CPT

## 2024-12-19 NOTE — CT
EXAMINATION TYPE: CT ChestAbdPelvis w con

CT DLP: 1045 mGycm, Automated exposure control for dose reduction was used.

 

DATE OF EXAM: 12/19/2024 2:10 PM

 

COMPARISON: CT chest abdomen pelvis 12/10/2021

 

CLINICAL INDICATION:Female, 79 years old with history of R63.4 ABNORMAL WEIGHT LOSS; PHH, ABNORMAL WE
IGHT LOSS, PT STATES SHE HAS LOST 80 LBS IN A COUPLE MONTHS.

 

Technique: Multiple axial images of the chest, abdomen, and pelvis were obtained following the intrav
enous administration of 100 mL Isovue-300. Oral contrast was administered. Two-dimensional coronal an
d sagittal reconstructions were obtained. 

 

Findings: 

 

CHEST:

LUNGS/ PLEURA: No pleural effusion, pneumothorax, or focal consolidation. No suspicious pulmonary nod
ule or mass.  

AIRWAY: Patent and unremarkable.

HEART: Cardiomegaly is demonstrated.No pericardial effusion. Coronary artery calcifications.

MEDIASTINUM: No evidence of adenopathy.

VASCULATURE:  No aortic aneurysm. Mild atherosclerotic calcification of the aorta and its branches.

MUSCULOSKELETAL: Mild disc degeneration changes are present throughout the thoracolumbar spine. No ag
gressive osseous lesion. No acute osseous abnormality.

SOFT TISSUES/LYMPH NODES: Dystrophic calcification within the right breast.

LOWER NECK: No significant findings.

 

ABDOMEN:

ABDOMEN

LIVER: Unremarkable

GALLBLADDER AND BILE DUCTS: Cholelithiasis. No biliary ductal dilatation.

PANCREAS: Unremarkable.

SPLEEN: Unremarkable.

ADRENAL GLANDS: Unremarkable.

KIDNEYS AND URETERS: No evidence of hydronephrosis or renal calculus. The  kidneys enhance symmetrica
lly. Contrast is demonstrated within both collecting systems on the delayed phase.

 

PELVIS

BLADDER: Incompletely distended but grossly unremarkable.

REPRODUCTIVE: The uterus is surgically absent.

 

ABDOMEN & PELVIS

STOMACH AND BOWEL: Stomach and duodenum are unremarkable. Distal colonic diverticulosis without evide
nce for acute diverticulitis. No focal bowel wall thickening or surrounding inflammatory changes. Ent
perla contrast reaches the transverse colon. No evidence of bowel obstruction. 

PERITONEUM: No evidence of pneumoperitoneum or free fluid.

 

VASCULATURE: Moderate atherosclerotic calcifications are present throughout the abdominal aorta and i
ts branches. No abdominal aortic aneurysm. Pelvic phleboliths.

MUSCULOSKELETAL: No acute osseous abnormalities. No aggressive osseous lesion.

Mild disc degeneration changes are present throughout the thoracolumbar spine. Grade 1 anterolisthesi
s of L4 on L5 and L5-S1. Mild retrolisthesis of T12 and L1, L1 on L2. Advanced degenerative changes o
f the pubic symphysis.

LYMPH NODES: No evidence for lymphadenopathy.

SOFT TISSUE/ABDOMINAL WALL: Unremarkable

 

IMPRESSION: 

1.  No significant abnormality to account for the patient's symptoms.

2.  Colonic diverticulosis without evidence for acute diverticulitis.

3.  Cholelithiasis.

 

[facility], Workstation: [id number], 12/19/2024 2:31 PM

## 2024-12-28 ENCOUNTER — HOSPITAL ENCOUNTER (EMERGENCY)
Dept: HOSPITAL 47 - EC | Age: 79
Discharge: HOME | End: 2024-12-28
Payer: MEDICARE

## 2024-12-28 VITALS
RESPIRATION RATE: 18 BRPM | TEMPERATURE: 98.4 F | DIASTOLIC BLOOD PRESSURE: 58 MMHG | HEART RATE: 60 BPM | SYSTOLIC BLOOD PRESSURE: 150 MMHG

## 2024-12-28 DIAGNOSIS — Z88.6: ICD-10-CM

## 2024-12-28 DIAGNOSIS — Z79.899: ICD-10-CM

## 2024-12-28 DIAGNOSIS — Z91.030: ICD-10-CM

## 2024-12-28 DIAGNOSIS — R07.89: Primary | ICD-10-CM

## 2024-12-28 DIAGNOSIS — I10: ICD-10-CM

## 2024-12-28 DIAGNOSIS — E78.00: ICD-10-CM

## 2024-12-28 LAB
ALBUMIN SERPL-MCNC: 4.3 G/DL (ref 3.5–5)
ALP SERPL-CCNC: 76 U/L (ref 38–126)
ALT SERPL-CCNC: 13 U/L (ref 4–34)
ANION GAP SERPL CALC-SCNC: 12 MMOL/L
APTT BLD: 21.7 SEC (ref 22–30)
AST SERPL-CCNC: 21 U/L (ref 14–36)
BASOPHILS # BLD AUTO: 0 K/UL (ref 0–0.2)
BASOPHILS NFR BLD AUTO: 0 %
BUN SERPL-SCNC: 23 MG/DL (ref 7–17)
CALCIUM SPEC-MCNC: 9.6 MG/DL (ref 8.4–10.2)
CHLORIDE SERPL-SCNC: 107 MMOL/L (ref 98–107)
CO2 SERPL-SCNC: 23 MMOL/L (ref 22–30)
EOSINOPHIL # BLD AUTO: 0.2 K/UL (ref 0–0.7)
EOSINOPHIL NFR BLD AUTO: 2 %
ERYTHROCYTE [DISTWIDTH] IN BLOOD BY AUTOMATED COUNT: 3.57 M/UL (ref 3.8–5.4)
ERYTHROCYTE [DISTWIDTH] IN BLOOD: 12.5 % (ref 11.5–15.5)
GLUCOSE SERPL-MCNC: 99 MG/DL (ref 74–99)
HCT VFR BLD AUTO: 34.1 % (ref 34–46)
HGB BLD-MCNC: 11 GM/DL (ref 11.4–16)
INR PPP: 1.1 (ref ?–1.2)
LYMPHOCYTES # SPEC AUTO: 1.9 K/UL (ref 1–4.8)
LYMPHOCYTES NFR SPEC AUTO: 22 %
MAGNESIUM SPEC-SCNC: 2 MG/DL (ref 1.6–2.3)
MCH RBC QN AUTO: 30.9 PG (ref 25–35)
MCHC RBC AUTO-ENTMCNC: 32.4 G/DL (ref 31–37)
MCV RBC AUTO: 95.6 FL (ref 80–100)
MONOCYTES # BLD AUTO: 0.4 K/UL (ref 0–1)
MONOCYTES NFR BLD AUTO: 5 %
NEUTROPHILS # BLD AUTO: 5.7 K/UL (ref 1.3–7.7)
NEUTROPHILS NFR BLD AUTO: 69 %
PLATELET # BLD AUTO: 258 K/UL (ref 150–450)
POTASSIUM SERPL-SCNC: 3.8 MMOL/L (ref 3.5–5.1)
PROT SERPL-MCNC: 7.5 G/DL (ref 6.3–8.2)
PT BLD: 11.7 SEC (ref 10–12.5)
SODIUM SERPL-SCNC: 142 MMOL/L (ref 137–145)
WBC # BLD AUTO: 8.4 K/UL (ref 3.8–10.6)

## 2024-12-28 PROCEDURE — 99285 EMERGENCY DEPT VISIT HI MDM: CPT

## 2024-12-28 PROCEDURE — 36415 COLL VENOUS BLD VENIPUNCTURE: CPT

## 2024-12-28 PROCEDURE — 71046 X-RAY EXAM CHEST 2 VIEWS: CPT

## 2024-12-28 PROCEDURE — 84484 ASSAY OF TROPONIN QUANT: CPT

## 2024-12-28 PROCEDURE — 85025 COMPLETE CBC W/AUTO DIFF WBC: CPT

## 2024-12-28 PROCEDURE — 85730 THROMBOPLASTIN TIME PARTIAL: CPT

## 2024-12-28 PROCEDURE — 80053 COMPREHEN METABOLIC PANEL: CPT

## 2024-12-28 PROCEDURE — 93005 ELECTROCARDIOGRAM TRACING: CPT

## 2024-12-28 PROCEDURE — 85610 PROTHROMBIN TIME: CPT

## 2024-12-28 PROCEDURE — 83735 ASSAY OF MAGNESIUM: CPT

## 2024-12-28 NOTE — XR
EXAMINATION TYPE: XR chest 2V

 

DATE OF EXAM: 12/28/2024 4:18 PM

 

COMPARISON: Chest radiographs from 2/3/2020

 

CLINICAL INDICATION: Female, 79 years old with history of Chest Pain; [facility]

 

TECHNIQUE: XR chest 2V Frontal and lateral views of the chest.

 

FINDINGS: 

Lungs/Pleura: There is flattening of the diaphragm with increased lucency of the lungs. No evidence o
f pneumothorax, pleural effusion or focal consolidation. 

Pulmonary vascularity: Unremarkable.

Heart/mediastinum: Cardiomediastinal silhouette is unremarkable.

Musculoskeletal: No acute osseous pathology.

 

IMPRESSION: 

1. No acute cardiopulmonary disease process.

 

2. COPD changes.

 

 

 

[facility], Workstation: [id number], 12/28/2024 4:30 PM

## 2024-12-28 NOTE — ED
Chest Pain HPI





- General


Chief Complaint: Chest Pain


Stated Complaint: Chest pain,JOMAR


Time Seen by Provider: 12/28/24 16:35


Source: patient, family, RN notes reviewed, old records reviewed


Mode of arrival: ambulatory


Limitations: no limitations





- History of Present Illness


Initial Comments: 





This is a 79 female to the ER for evaluation of chest tightness chest tightness 

and shortness of breath prior to arrival.  Patient presents with her son tonight

who was with her when this event started.  Patient does admit upon arriving to 

the hospital she feels improved.  Patient does have history of high blood 

pressure high cholesterol but no other significant medical history no nausea 

vomiting or diarrhea no recent fevers or known illness.  And again patient 

symptoms are improved since arrival to the ER


MD Complaint: chest pain


-: hour(s)


Onset: during rest, during exertion


Pain Location: substernal, left chest


Pain Radiation: LUE


Severity: moderate


Severity scale (1-10): 4


Consistency: constant


Improves With: nothing


Worsens With: nothing


Anginal Symptoms: sense of impending doom


Other Symptoms: palpitations


Treatments Prior to Arrival: none





- Related Data


                                Home Medications











 Medication  Instructions  Recorded  Confirmed


 


Levothyroxine Sodium [Synthroid] 75 mcg PO DAILY 12/23/15 12/12/22


 


Losartan Potassium 100 mg PO DAILY 12/23/15 12/12/22


 


Simvastatin [Zocor] 20 mg PO DAILY 09/25/18 12/12/22


 


Cetirizine HCl [Zyrtec] 20 mg PO DAILY 12/12/22 12/12/22











                                    Allergies











Allergy/AdvReac Type Severity Reaction Status Date / Time


 


venom-honey bee Allergy  Anaphylaxis Verified 12/28/24 14:54





[bee venom (honey bee)]     


 


aspirin AdvReac  severe Verified 12/28/24 14:54





   stomach  





   pain  














Review of Systems


ROS Statement: 


Those systems with pertinent positive or pertinent negative responses have been 

documented in the HPI.





ROS Other: All systems not noted in ROS Statement are negative.





EKG Findings





- EKG Comments:


EKG Findings:: EKG is sinus 62   QTc 425





- EKG Results:


EKG: interpreted by [employer]





Past Medical History


Past Medical History: Hyperlipidemia, Hypertension


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


History of Any Multi-Drug Resistant Organisms: None Reported


Past Surgical History: Appendectomy, Hysterectomy, Orthopedic Surgery


Past Anesthesia/Blood Transfusion Reactions: No Reported Reaction


Past Psychological History: No Psychological Hx Reported


Smoking Status: Never smoker


Past Alcohol Use History: None Reported


Past Drug Use History: None Reported





- Past Family History


  ** Father


Additional Family Medical History / Comment(s): lung





  ** Mother


Additional Family Medical History / Comment(s): pancreas,liver





General Exam


Limitations: no limitations


General appearance: alert, in no apparent distress, cachectic


Head exam: Present: atraumatic, normocephalic, normal inspection


Eye exam: Present: normal appearance, PERRL, EOMI.  Absent: scleral icterus, 

conjunctival injection, periorbital swelling


ENT exam: Present: normal exam, mucous membranes moist


Neck exam: Present: normal inspection.  Absent: tenderness, meningismus, 

lymphadenopathy


Respiratory exam: Present: normal lung sounds bilaterally.  Absent: respiratory 

distress, wheezes, rales, rhonchi, stridor


Cardiovascular Exam: Present: regular rate, normal rhythm, normal heart sounds. 

Absent: systolic murmur, diastolic murmur, rubs, gallop, clicks


GI/Abdominal exam: Present: soft, normal bowel sounds.  Absent: distended, 

tenderness, guarding, rebound, rigid


Extremities exam: Present: normal inspection, full ROM, normal capillary refill.

 Absent: tenderness, pedal edema, joint swelling, calf tenderness


Back exam: Present: normal inspection


Neurological exam: Present: alert, oriented X3, CN II-XII intact


Psychiatric exam: Present: normal affect, normal mood


Skin exam: Present: warm, dry, intact, normal color.  Absent: rash





Course


                                   Vital Signs











  12/28/24 12/28/24





  14:52 18:43


 


Temperature 98.6 F 98.8 F


 


Pulse Rate 62 56 L


 


Respiratory 18 17





Rate  


 


Blood Pressure 124/80 170/69


 


O2 Sat by Pulse 98 98





Oximetry  














- Reevaluation(s)


Reevaluation #1: 





12/28/24 19:26


Medical records reviewed


Reevaluation #2: 





12/28/24 19:26


Patient chest pain remains resolved


Reevaluation #3: 





12/28/24 19:26


Patient informed of results questions answered


Reevaluation #4: 





Was pt. sent in by a medical professional or institution (, PA, NP, urgent 

care, hospital, or nursing home...) When possible be specific


@  -no


Did you speak to anyone other than the patient for history (EMS, parent, family,

police, friend...)? What history was obtained from this source 


@  -no


Did you review nursing and triage notes (agree or disagree)?  Why? 


@  -agree


Are old charts reviewed (outside hosp., previous admission, EMS record, old EKG,

old radiological studies, urgent care reports/EKG's, nursing home records)? 

Report findings 


@  -yes


Differential Diagnosis (chest pain, altered mental status, abdominal pain women,

abdominal pain men, vaginal bleeding, weakness, fever, dyspnea, syncope, 

headache, dizziness, GI bleed, back pain, seizure, CVA, palpatations, mental 

health, musculoskeletal)? 


@  -prior


EKG interpreted by me (3pts min.).


@  -yes


X-rays interpreted by me (1pt min.).


@  -yes negative for acute disease


CT interpreted by me (1pt min.).


@  -no


U/S interpreted by me (1pt. min.).


@  -no


What testing was considered but not performed or refused? (CT, X-rays, U/S, 

labs)? Why?


@  -none


What meds were considered but not given or refused? Why?


@  -none


Did you discuss the management of the patient with other professionals 

(professionals i.e. , PA, NP, lab, RT, psych nurse, , , 

teacher, , )? Give summary


@  -no


Was smoking cessation discussed for >3mins.?


@  -no


Was critical care preformed (if so, how long)?


@  -no


Were there social determinants of health that impacted care today? How? 

(Homelessness, low income, unemployed, alcoholism, drug addiction, 

transportation, low edu. Level, literacy, decrease access to med. care, [facility], 

rehab)?


@  -none


Was there de-escalation of care discussed even if they declined (Discuss DNR or 

withdrawal of care, Hospice)? DNR status


@  -no


What co-morbidities impacted this encounter? (DM, HTN, Smoking, COPD, CAD, 

Cancer, CVA, ARF, Chemo, Hep., AIDS, mental health diagnosis, sleep apnea, morbi

d obesity)?


@  -none


Was patient admitted / discharged? Hospital course, mention meds given and 

route, prescriptions, significant lab abnormalities, going to OR and other 

pertinent info.


@  - 


Undiagnosed new problem with uncertain prognosis?


@  -no


Drug Therapy requiring intensive monitoring for toxicity (Heparin, Nitro, 

Insulin, Cardizem)?


@  -no


Were any procedures done?


@  -no


Diagnosis/symptom?


@  -


Acute, or Chronic, or Acute on Chronic?


@  -Acute


Uncomplicated (without systemic symptoms) or Complicated (systemic symptoms)?


@  -Complicated


Side effects of treatment?


@  -no


Exacerbation, Progression, or Severe Exacerbation?


@  -exacerbation


Poses a threat to life or bodily function? How? (Chest pain, USA, MI, pneumonia,

PE, COPD, DKA, ARF, appy, cholecystitis, CVA, Diverticulitis, Homicidal, 

Suicidal, threat to staff... and all critical care pts)


@  -yes


Reevaluation #5: 





Differential Chest Pain:


Stable Angina, Unstable Angina, STEMI, NSTEMI Aortic Dissection, Pneumothorax, 

Musculoskeletal, Esophageal Spasm GERD, Cholecystitis, Pancreatitis, Zoster, 

this is not meant to be an all-inclusive list. 








Chest Pain MDM





- MDM





79 female to ER with chest pain chest pain chest tightness and shortness of 

breath.  Troponin negative x 2 x-ray negative EKG normal.





Disposition


Clinical Impression: 


 Chest pain





Disposition: HOME SELF-CARE


Condition: Fair


Instructions (If sedation given, give patient instructions):  Chest Pain (ED)


Is patient prescribed a controlled substance at d/c from ED?: No


Referrals: 


Bobby Aguilar MD [Primary Care Provider] - 1-2 days


Time of Disposition: 19:35

## 2025-06-12 ENCOUNTER — HOSPITAL ENCOUNTER (OUTPATIENT)
Dept: HOSPITAL 47 - LABWHC1 | Age: 80
Discharge: HOME | End: 2025-06-12
Attending: STUDENT IN AN ORGANIZED HEALTH CARE EDUCATION/TRAINING PROGRAM
Payer: MEDICARE

## 2025-06-12 DIAGNOSIS — N18.31: Primary | ICD-10-CM

## 2025-06-12 DIAGNOSIS — D63.1: ICD-10-CM

## 2025-06-12 LAB
ACANTHOCYTES BLD QL SMEAR: (no result)
AGRAN PLATELETS BLD QL SMEAR: (no result)
ANION GAP SERPL CALC-SCNC: 10.6 MMOL/L (ref 4–12)
ANISOCYTOSIS BLD QL SMEAR: (no result)
ANISOCYTOSIS BLD QL: (no result)
AUER BODIES BLD QL SMEAR: (no result)
BASO STIPL BLD QL SMEAR: (no result)
BASOPHILS # BLD AUTO: 0.04 X 10*3/UL (ref 0–0.1)
BASOPHILS # BLD MANUAL: (no result) K/UL (ref 0–0.2)
BASOPHILS NFR BLD AUTO: 0.7 %
BASOPHILS NFR SPEC MANUAL: (no result) %
BLASTS # BLD MANUAL: (no result) %
BLASTS # BLD MANUAL: (no result) K/UL
BUN SERPL-SCNC: 19.9 MG/DL (ref 9–27)
BUN/CREAT SERPL: 18.09 RATIO (ref 12–20)
BURR CELLS BLD QL SMEAR: (no result)
BURR CELLS BLD QL SMEAR: (no result)
CALCIUM SPEC-MCNC: 9.5 MG/DL (ref 8.7–10.3)
CELLS COUNTED: (no result)
CHLORIDE SERPL-SCNC: 106 MMOL/L (ref 96–109)
CO2 SERPL-SCNC: 25.4 MMOL/L (ref 21.6–31.8)
CREATININE: 1.1 MG/DL (ref 0.6–1.5)
DACRYOCYTES BLD QL SMEAR: (no result)
DOHLE BOD BLD QL SMEAR: (no result)
ELLIPTOCYTES BLD QL SMEAR: (no result)
EOSINOPHIL # BLD AUTO: 0.21 X 10*3/UL (ref 0.04–0.35)
EOSINOPHIL # BLD MANUAL: (no result) K/UL (ref 0–0.7)
EOSINOPHIL NFR BLD AUTO: 3.9 %
EOSINOPHIL NFR BLD MANUAL: (no result) %
ERYTHROCYTE [DISTWIDTH] IN BLOOD BY AUTOMATED COUNT: 3.52 X 10*6/UL (ref 4.1–5.2)
ERYTHROCYTE [DISTWIDTH] IN BLOOD: 14.3 % (ref 11.5–14.5)
EST. AVERAGE GLUCOSE BLD GHB EST-MCNC: 117 MG/DL
FERRITIN SERPL-MCNC: 382 NG/ML (ref 10–291)
GIANT PLATELETS BLD QL SMEAR: (no result)
GLUCOSE SERPL-MCNC: 93 MG/DL (ref 70–110)
HCT VFR BLD AUTO: 34 % (ref 37.2–46.3)
HELMET CELLS BLD QL SMEAR: (no result)
HGB BLD-MCNC: 10.5 G/DL (ref 12–15)
HOWELL-JOLLY BOD BLD QL SMEAR: (no result)
HYPERCHROMASIA: (no result)
HYPOCHROMIA BLD QL SMEAR: (no result)
HYPOCHROMIA BLD QL: (no result)
IMM GRANULOCYTES # BLD: 0.01 X 10*3/UL (ref 0–0.04)
IMM GRANULOCYTES BLD QL AUTO: 0.2 %
IRON SATN MFR SERPL: 20.88 % (ref 12–45)
IRON SERPL-MCNC: 52 UG/DL (ref 50–170)
LG PLATELETS BLD QL SMEAR: (no result)
LYMPHOCYTES # BLD MANUAL: (no result) K/UL (ref 1–4.8)
LYMPHOCYTES # SPEC AUTO: 2.52 X 10*3/UL (ref 0.9–5)
LYMPHOCYTES NFR BLD MANUAL: (no result) %
LYMPHOCYTES NFR SPEC AUTO: 46.5 %
Lab: (no result)
Lab: (no result)
MACROCYTES BLD QL SMEAR: (no result)
MACROCYTES BLD QL: (no result)
MCH RBC QN AUTO: 29.8 PG (ref 27–32)
MCHC RBC AUTO-ENTMCNC: 30.9 G/DL (ref 32–37)
MCV RBC AUTO: 96.6 FL (ref 80–97)
METAMYELOCYTES # BLD: (no result) K/UL
METAMYELOCYTES NFR BLD MANUAL: (no result) %
MICROCYTES BLD QL SMEAR: (no result)
MICROCYTOSIS: (no result)
MONOCYTES # BLD AUTO: 0.52 X 10*3/UL (ref 0.2–1)
MONOCYTES # BLD MANUAL: (no result) K/UL (ref 0–1)
MONOCYTES NFR BLD AUTO: 9.6 %
MONOCYTES NFR BLD MANUAL: (no result) %
MYELOCYTES # BLD MANUAL: (no result) K/UL
MYELOCYTES NFR BLD MANUAL: (no result) %
NEUTROPHILS # BLD AUTO: 2.12 X 10*3/UL (ref 1.8–7.7)
NEUTROPHILS NFR BLD AUTO: 39.1 %
NEUTROPHILS NFR BLD MANUAL: (no result) %
NEUTS BAND # BLD MANUAL: (no result) K/UL
NEUTS BAND NFR BLD: (no result) %
NEUTS HYPERSEG # BLD: (no result) 10*3/UL
NEUTS SEG # BLD MANUAL: (no result) K/UL (ref 1.3–7.7)
NRBC # BLD: (no result) /100 WBC (ref 0–0)
NRBC BLD AUTO-RTO: 0 X 10*3/UL (ref 0–0.01)
OTHER CELLS NFR BLD MANUAL: (no result) %
OVALOCYTES BLD QL SMEAR: (no result)
PAPPENHEIMER BOD BLD QL SMEAR: (no result)
PARASITE [PRESENCE] IN BLOOD BY LIGHT MICROSCOPY: (no result)
PELGER HUET CELLS BLD QL SMEAR: (no result)
PLASMA CELLS # BLD MANUAL: (no result) %
PLASMA CELLS # BLD MANUAL: (no result) K/UL
PLATELET # BLD AUTO: 224 X 10*3/UL (ref 140–440)
PLATELETS.RETICULATED NFR BLD AUTO: (no result) %
PMV BLD AUTO: 12.8 FL (ref 9.5–12.2)
POIKILOCYTOSIS BLD QL SMEAR: (no result)
POIKILOCYTOSIS BLD QL SMEAR: (no result)
POIKILOCYTOSIS: (no result)
POLYCHROMASIA BLD QL SMEAR: (no result)
POTASSIUM SERPL-SCNC: 4.4 MMOL/L (ref 3.5–5.5)
PROMYELOCYTES # BLD: (no result) K/UL
PROMYELOCYTES NFR BLD MANUAL: (no result) %
RBC AGGLUTINATION: (no result)
RBC MORPH BLD: (no result)
ROULEAUX BLD QL SMEAR: (no result)
SCHISTOCYTES BLD QL SMEAR: (no result)
SCHISTOCYTES BLD QL SMEAR: (no result)
SICKLE CELLS BLD QL SMEAR: (no result)
SMUDGE CELLS BLD QL SMEAR: (no result)
SODIUM SERPL-SCNC: 142 MMOL/L (ref 135–145)
SPHEROCYTES BLD QL SMEAR: (no result)
STOMATOCYTES BLD QL SMEAR: (no result)
TARGETS BLD QL SMEAR: (no result)
TIBC SERPL-MCNC: 249 UG/DL (ref 228–460)
TOXIC GRANULES BLD QL SMEAR: (no result)
VARIANT LYMPHS BLD QL SMEAR: (no result)
VARIANT LYMPHS BLD QL SMEAR: (no result)
WBC # BLD AUTO: 5.42 X 10*3/UL (ref 4.5–10)
WBC NRBC COR # BLD: (no result) K/UL
WBC TOXIC VACUOLES BLD QL SMEAR: (no result)

## 2025-06-12 PROCEDURE — 36415 COLL VENOUS BLD VENIPUNCTURE: CPT

## 2025-06-12 PROCEDURE — 83540 ASSAY OF IRON: CPT

## 2025-06-12 PROCEDURE — 83550 IRON BINDING TEST: CPT

## 2025-06-12 PROCEDURE — 85025 COMPLETE CBC W/AUTO DIFF WBC: CPT

## 2025-06-12 PROCEDURE — 82728 ASSAY OF FERRITIN: CPT

## 2025-06-12 PROCEDURE — 83036 HEMOGLOBIN GLYCOSYLATED A1C: CPT

## 2025-06-12 PROCEDURE — 80048 BASIC METABOLIC PNL TOTAL CA: CPT
